# Patient Record
Sex: FEMALE | URBAN - METROPOLITAN AREA
[De-identification: names, ages, dates, MRNs, and addresses within clinical notes are randomized per-mention and may not be internally consistent; named-entity substitution may affect disease eponyms.]

---

## 2023-01-28 ENCOUNTER — HOSPITAL ENCOUNTER (EMERGENCY)
Age: 23
Discharge: LEFT BEFORE DISCHARGE | End: 2023-01-28

## 2023-01-28 ENCOUNTER — APPOINTMENT (OUTPATIENT)
Dept: ULTRASOUND IMAGING | Age: 23
End: 2023-01-28

## 2023-01-28 VITALS
OXYGEN SATURATION: 100 % | DIASTOLIC BLOOD PRESSURE: 86 MMHG | TEMPERATURE: 98.2 F | RESPIRATION RATE: 14 BRPM | HEART RATE: 86 BPM | HEIGHT: 63 IN | SYSTOLIC BLOOD PRESSURE: 134 MMHG | WEIGHT: 148 LBS | BODY MASS INDEX: 26.22 KG/M2

## 2023-01-28 PROCEDURE — 99281 EMR DPT VST MAYX REQ PHY/QHP: CPT

## 2023-01-29 NOTE — ED NOTES
Department of Emergency Medicine  FIRST PROVIDER TRIAGE NOTE             Independent MLP           1/28/23  8:26 PM EST    Date of Encounter: 1/28/23   MRN: [id number]      HPI: Kim Guo is a 25 y.o. female who presents to the ED for Vaginal Bleeding (Pt had positive preg test, states she should be 7 weeks pregnant, started bleeding x 1 hour ago during intercourse, states bright red blood)  And complains of vaginal bleeding which started about an hour ago during intercourse. Patient states that she thinks that she is roughly around 7 weeks pregnant. ROS: Negative for cp or sob. PE: Gen Appearance/Constitutional: alert  CV: regular rate  Pulm: CTA bilat     Initial Plan of Care: All treatment areas with department are currently occupied. Plan to order/Initiate the following while awaiting opening in ED: labs and imaging studies.   Initiate Treatment-Testing, Proceed toTreatment Area When Bed Available for ED Attending/MLP to Continue Care    Electronically signed by TU Golden CNP   DD: 1/28/23       TU Golden CNP  01/28/23 2027

## 2023-01-29 NOTE — ED NOTES
Pt was called back for ultrasound 3 times by tech and 3 more times by this RN bathrooms were checked pt not found      Vin Muhammad RN  01/28/23 7523

## 2023-02-20 LAB
ERYTHROCYTE DISTRIBUTION WIDTH (RATIO) BY AUTOMATED COUNT: 12.8 % (ref 11.5–14.5)
ERYTHROCYTE MEAN CORPUSCULAR HEMOGLOBIN CONCENTRATION (G/DL) BY AUTOMATED: 32.7 G/DL (ref 32–36)
ERYTHROCYTE MEAN CORPUSCULAR VOLUME (FL) BY AUTOMATED COUNT: 92 FL (ref 80–100)
ERYTHROCYTES (10*6/UL) IN BLOOD BY AUTOMATED COUNT: 4.54 X10E12/L (ref 4–5.2)
HEMATOCRIT (%) IN BLOOD BY AUTOMATED COUNT: 41.9 % (ref 36–46)
HEMOGLOBIN (G/DL) IN BLOOD: 13.7 G/DL (ref 12–16)
LEUKOCYTES (10*3/UL) IN BLOOD BY AUTOMATED COUNT: 10.7 X10E9/L (ref 4.4–11.3)
PLATELETS (10*3/UL) IN BLOOD AUTOMATED COUNT: 293 X10E9/L (ref 150–450)
REFLEX ADDED, ANEMIA PANEL: NORMAL

## 2023-02-21 LAB
ABO GROUP (TYPE) IN BLOOD: NORMAL
ANTIBODY SCREEN: NORMAL
ERYTHROCYTE DISTRIBUTION WIDTH (RATIO) BY AUTOMATED COUNT: NORMAL
ERYTHROCYTE MEAN CORPUSCULAR HEMOGLOBIN CONCENTRATION (G/DL) BY AUTOMATED: NORMAL
ERYTHROCYTE MEAN CORPUSCULAR VOLUME (FL) BY AUTOMATED COUNT: NORMAL
ERYTHROCYTES (10*6/UL) IN BLOOD BY AUTOMATED COUNT: NORMAL
HEMATOCRIT (%) IN BLOOD BY AUTOMATED COUNT: NORMAL
HEMOGLOBIN (G/DL) IN BLOOD: NORMAL
HEPATITIS B VIRUS SURFACE AG PRESENCE IN SERUM: NONREACTIVE
HEPATITIS B VIRUS SURFACE AG PRESENCE IN SERUM: NORMAL
HIV 1/ 2 AG/AB SCREEN: NONREACTIVE
HIV 1/ 2 AG/AB SCREEN: NORMAL
LEUKOCYTES (10*3/UL) IN BLOOD BY AUTOMATED COUNT: NORMAL
NRBC (PER 100 WBCS) BY AUTOMATED COUNT: NORMAL
PLATELETS (10*3/UL) IN BLOOD AUTOMATED COUNT: NORMAL
REFLEX ADDED, ANEMIA PANEL: NORMAL
RH FACTOR: NORMAL
RUBELLA VIRUS IGG AB: NORMAL
RUBELLA VIRUS IGG AB: POSITIVE
SYPHILIS TOTAL AB: NONREACTIVE
SYPHILIS TOTAL AB: NORMAL

## 2023-03-29 LAB
CLUE CELLS: NORMAL
NUGENT SCORE: 0
YEAST: NORMAL

## 2023-04-05 LAB — URINE CULTURE: NO GROWTH

## 2023-04-06 LAB — LAB MOLECULAR CA TECHNICAL NOTES: NORMAL

## 2023-06-05 LAB
ANTIBODY SCREEN: NORMAL
ERYTHROCYTE DISTRIBUTION WIDTH (RATIO) BY AUTOMATED COUNT: 13.7 % (ref 11.5–14.5)
ERYTHROCYTE MEAN CORPUSCULAR HEMOGLOBIN CONCENTRATION (G/DL) BY AUTOMATED: 32.5 G/DL (ref 32–36)
ERYTHROCYTE MEAN CORPUSCULAR VOLUME (FL) BY AUTOMATED COUNT: 95 FL (ref 80–100)
ERYTHROCYTES (10*6/UL) IN BLOOD BY AUTOMATED COUNT: 3.89 X10E12/L (ref 4–5.2)
GLUCOSE, 1 HR SCREEN, PREG: 127 MG/DL
HEMATOCRIT (%) IN BLOOD BY AUTOMATED COUNT: 36.9 % (ref 36–46)
HEMOGLOBIN (G/DL) IN BLOOD: 12 G/DL (ref 12–16)
LEUKOCYTES (10*3/UL) IN BLOOD BY AUTOMATED COUNT: 13.1 X10E9/L (ref 4.4–11.3)
PLATELETS (10*3/UL) IN BLOOD AUTOMATED COUNT: 272 X10E9/L (ref 150–450)
REFLEX ADDED, ANEMIA PANEL: ABNORMAL
SYPHILIS TOTAL AB: NONREACTIVE

## 2023-06-10 LAB
CHLAMYDIA TRACH., AMPLIFIED: NEGATIVE
CLUE CELLS: ABNORMAL
N. GONORRHEA, AMPLIFIED: NEGATIVE
NUGENT SCORE: 1
YEAST: PRESENT

## 2023-07-09 ENCOUNTER — HOSPITAL ENCOUNTER (OUTPATIENT)
Dept: DATA CONVERSION | Facility: HOSPITAL | Age: 23
End: 2023-07-09
Attending: OBSTETRICS & GYNECOLOGY
Payer: COMMERCIAL

## 2023-07-09 DIAGNOSIS — Z3A.30 30 WEEKS GESTATION OF PREGNANCY (HHS-HCC): ICD-10-CM

## 2023-07-09 DIAGNOSIS — M54.9 DORSALGIA, UNSPECIFIED: ICD-10-CM

## 2023-07-09 DIAGNOSIS — O99.353 DISEASES OF THE NERVOUS SYSTEM COMPLICATING PREGNANCY, THIRD TRIMESTER (HHS-HCC): ICD-10-CM

## 2023-07-09 DIAGNOSIS — O26.893 OTHER SPECIFIED PREGNANCY RELATED CONDITIONS, THIRD TRIMESTER (HHS-HCC): ICD-10-CM

## 2023-07-09 DIAGNOSIS — Z34.80 ENCOUNTER FOR SUPERVISION OF OTHER NORMAL PREGNANCY, UNSPECIFIED TRIMESTER (HHS-HCC): ICD-10-CM

## 2023-07-09 DIAGNOSIS — R10.2 PELVIC AND PERINEAL PAIN: ICD-10-CM

## 2023-07-09 DIAGNOSIS — O99.891 OTHER SPECIFIED DISEASES AND CONDITIONS COMPLICATING PREGNANCY (HHS-HCC): ICD-10-CM

## 2023-07-09 DIAGNOSIS — G43.909 MIGRAINE, UNSPECIFIED, NOT INTRACTABLE, WITHOUT STATUS MIGRAINOSUS: ICD-10-CM

## 2023-07-11 LAB — URINE CULTURE: NORMAL

## 2023-08-20 ENCOUNTER — HOSPITAL ENCOUNTER (OUTPATIENT)
Dept: DATA CONVERSION | Facility: HOSPITAL | Age: 23
End: 2023-08-20
Attending: OBSTETRICS & GYNECOLOGY
Payer: COMMERCIAL

## 2023-08-20 DIAGNOSIS — O12.03 GESTATIONAL EDEMA, THIRD TRIMESTER (HHS-HCC): ICD-10-CM

## 2023-08-20 DIAGNOSIS — Z3A.36 36 WEEKS GESTATION OF PREGNANCY (HHS-HCC): ICD-10-CM

## 2023-08-20 DIAGNOSIS — O26.813: ICD-10-CM

## 2023-08-20 DIAGNOSIS — R03.0 ELEVATED BLOOD-PRESSURE READING, WITHOUT DIAGNOSIS OF HYPERTENSION: ICD-10-CM

## 2023-08-20 DIAGNOSIS — O98.813 OTHER MATERNAL INFECTIOUS AND PARASITIC DISEASES COMPLICATING PREGNANCY, THIRD TRIMESTER (HHS-HCC): ICD-10-CM

## 2023-08-20 DIAGNOSIS — B37.2 CANDIDIASIS OF SKIN AND NAIL: ICD-10-CM

## 2023-08-20 DIAGNOSIS — O26.893 OTHER SPECIFIED PREGNANCY RELATED CONDITIONS, THIRD TRIMESTER (HHS-HCC): ICD-10-CM

## 2023-08-20 LAB
ALANINE AMINOTRANSFERASE (SGPT) (U/L) IN SER/PLAS: 11 U/L (ref 7–45)
ALBUMIN (G/DL) IN SER/PLAS: 3.4 G/DL (ref 3.4–5)
ALKALINE PHOSPHATASE (U/L) IN SER/PLAS: 147 U/L (ref 33–110)
ANION GAP IN SER/PLAS: 13 MMOL/L (ref 10–20)
ASPARTATE AMINOTRANSFERASE (SGOT) (U/L) IN SER/PLAS: 19 U/L (ref 9–39)
BILIRUBIN TOTAL (MG/DL) IN SER/PLAS: 0.3 MG/DL (ref 0–1.2)
CALCIUM (MG/DL) IN SER/PLAS: 8.5 MG/DL (ref 8.6–10.3)
CARBON DIOXIDE, TOTAL (MMOL/L) IN SER/PLAS: 22 MMOL/L (ref 21–32)
CHLORIDE (MMOL/L) IN SER/PLAS: 103 MMOL/L (ref 98–107)
CREATININE (MG/DL) IN SER/PLAS: 0.63 MG/DL (ref 0.5–1.05)
CREATININE (MG/DL) IN URINE: 48.8 MG/DL (ref 20–320)
ERYTHROCYTE DISTRIBUTION WIDTH (RATIO) BY AUTOMATED COUNT: 13 % (ref 11.5–14.5)
ERYTHROCYTE MEAN CORPUSCULAR HEMOGLOBIN CONCENTRATION (G/DL) BY AUTOMATED: 32.8 G/DL (ref 32–36)
ERYTHROCYTE MEAN CORPUSCULAR VOLUME (FL) BY AUTOMATED COUNT: 91 FL (ref 80–100)
ERYTHROCYTES (10*6/UL) IN BLOOD BY AUTOMATED COUNT: 3.87 X10E12/L (ref 4–5.2)
GFR FEMALE: >90 ML/MIN/1.73M2
GLUCOSE (MG/DL) IN SER/PLAS: 66 MG/DL (ref 74–99)
HEMATOCRIT (%) IN BLOOD BY AUTOMATED COUNT: 35.1 % (ref 36–46)
HEMOGLOBIN (G/DL) IN BLOOD: 11.5 G/DL (ref 12–16)
LACTATE DEHYDROGENASE (U/L) IN SER/PLAS BY LAC->PYR RXN: 177 U/L (ref 84–246)
LEUKOCYTES (10*3/UL) IN BLOOD BY AUTOMATED COUNT: 15.3 X10E9/L (ref 4.4–11.3)
PLATELETS (10*3/UL) IN BLOOD AUTOMATED COUNT: 309 X10E9/L (ref 150–450)
POTASSIUM (MMOL/L) IN SER/PLAS: 4.1 MMOL/L (ref 3.5–5.3)
PROTEIN (MG/DL) IN URINE: 11 MG/DL (ref 5–24)
PROTEIN TOTAL: 6.4 G/DL (ref 6.4–8.2)
PROTEIN/CREATININE (MG/MG) IN URINE: 0.23 MG/MG CREAT (ref 0–0.17)
SODIUM (MMOL/L) IN SER/PLAS: 134 MMOL/L (ref 136–145)
URATE (MG/DL) IN SER/PLAS: 3.8 MG/DL (ref 2.3–6.7)
UREA NITROGEN (MG/DL) IN SER/PLAS: 6 MG/DL (ref 6–23)

## 2023-08-22 LAB — GROUP B STREP SCREEN: NORMAL

## 2023-08-25 LAB
MISCELLANEUOUS TEST RESULT: NORMAL
NAME OF SENDOUT TEST: NORMAL

## 2023-09-29 VITALS — HEIGHT: 63 IN | BODY MASS INDEX: 32.81 KG/M2 | WEIGHT: 185.19 LBS

## 2023-09-30 NOTE — PROGRESS NOTES
Current Stage:   Stage: Triage     OB Dating:   EDC/EGA:  ·  Final CLAUDIA 17-Sep-2023   ·  EGA 30     Subjective Data:   Antepartum:  Vaginal Bleeding: No   Contractions/Abdominal Pain: Yes   Discharge/Loss of Fluid: No   Fetal Movement: Good   Fevers/Chills: No   Preeclampsia Symptoms: No   Antepartum:    24 yo.  at 30 weeks, presenting with pelvic pain and back pain for 3 days. She reports normal fetal movement, denies any contractions, vaginal bleeding or recent  intercourse. She notes that her pain is aggravated by physical activity and relieved by rest. She denies any pain or burning  with urination or fever/chills. She states that she has been hydrating well. Current pregnancy has been complicated with migraines  and back pain         Physical Exam:   Constitutional: alert, oriented   Obstetric: [place]: no contractions noted  FHT: 150s, moderate variability, appropriate for gestational age  SVE: closed/posterior   Gastrointestinal: Uterus is gravid and nontender   Genitourinary: No abnormal discharge noted   Musculoskeletal: Normal gait and negative muscle  weakness   Extremities: No edema or calf tenderness noted   Breast: no masses   Psychological: appropriate affect   Skin: no rashes or lesions     Assessment and Plan:        Additional Dx:   Back pain during pregnancy in third trimester: Entered Date:  2023 15:06   Pain of round ligament affecting pregnancy, antepartum: Entered  Date: 2023 15:06   30 weeks gestation of pregnancy: Entered Date: 2023  15:06    Assessment:    24 yo.  at 30 weeks, here with round ligament and back pain  No contractions noted , and cervix is close  She denies any dysuria  Urine dip was positive for trace blood, will order CC urine cultures  Discussed increased hydration with electrolytes and ES tylenol   Also discussed pregnancy support belly band, especially with increased activity  Patient reassured and discharged in stable condition.        Electronic Signatures:  Clara Mayo)  (Signed 09-Jul-2023 15:12)   Authored: Current Stage, OB Dating, Subjective Data,  Objective Data, Assessment and Plan, Note Completion      Last Updated: 09-Jul-2023 15:12 by Clara Mayo)

## 2023-09-30 NOTE — PROGRESS NOTES
"    Current Stage:   Stage: Triage     OB Dating:   EDC/EGA:  ·  Final CLAUDIA 16-Sep-2023   ·  EGA 36.1     Subjective Data:   Antepartum:  Vaginal Bleeding: No   Contractions/Abdominal Pain: No   Discharge/Loss of Fluid: No   Fetal Movement: Good   Fevers/Chills: No   Preeclampsia Symptoms: Fatigue, rib tightness   Antepartum:    24 yo G1 at 36/1 wks, CLAUDIA 9/16/23 c/w US, presenting to triage for elevated home BP. She was found to have a BP of 130/95 at home. She has felt fatigued and \"off\"  over the past few days. She did have chest tightness evaluated in the ED, ECG normal. It is worse with inspiration. She has mild SOB when exerting herself.  No HA. No RUQ pain, n/v, or calf pain.    Pt additionally has some irritation and pain in the inguinal region over the past few days. No changes in detergents. No new clothing. She states that she has been itching it. No drainage and the area has not been expanding.      Objective Information:    Objective Information:      T   P  R  BP   MAP  SpO2   Value  36.1  109  16  111/75   88  98%  Date/Time 8/20 12:21 8/20 13:05 8/20 12:55 8/20 12:55  8/20 12:55 8/20 13:05  Range  (36.1C - 36.1C )  (80 - 109 )  (16 - 17 )  (111 - 129 )/ (72 - 82 )  (88 - 88 )  (97% - 99% )      Pain reported at 8/20 12:21: 0 = None      Physical Exam:   Constitutional: alert, oriented   Obstetric: Minimal blood on pad.   Head/Neck: neck supple, no thyromegaly   Respiratory/Thorax: normal respiratory effort, lungs  clear, no wheezes or rhonchi   Gastrointestinal: Soft, nontender. Uterus 2 below  umbilicus.   Genitourinary: Small, erythematous rash of the inguinal  region. Greater on the left than right. No ulcerations. nontender to palpation.   Extremities: +1 edema bilateral LE     Assessment and Plan:   Assessment:    24 yo G1 at 36/1 wks, CLAUDIA 9/16/23 c/w US, presenting to triage for r/o Pre-E.    1. R/o Pre-E  -VSS, BP normotensive in triage.  -NST reactive: 135/mod samira/pos accel/neg decel  TOCO: " quiet  -Vague symptoms, no HA.  -CBC, CMP, LDH, uric acid, and protein/creatinine ratio wnl. Pt instructed to bring her BP cuff in tomorrow.    2. Subcutaneous yeast infection.  -Discussed options with patient. She would prefer to try Topical Hydrocortisone with Nystatin powder over the counter. Pt has appt in AM tomorrow.    F/u as scheduled tomorrow.     Plan of Care Reviewed With:  Plan of Care Reviewed With: patient       Electronic Signatures:  Amarjit Short)  (Signed 20-Aug-2023 14:46)   Authored: Current Stage, OB Dating, Subjective Data,  Objective Data, Assessment and Plan, Note Completion      Last Updated: 20-Aug-2023 14:46 by Amarjit Short)

## 2023-10-13 PROBLEM — R31.9 HEMATURIA: Status: ACTIVE | Noted: 2023-10-13

## 2023-10-13 PROBLEM — Z86.19 HISTORY OF HERPES GENITALIS: Status: ACTIVE | Noted: 2023-10-13

## 2023-10-13 PROBLEM — O13.9 GESTATIONAL HYPERTENSION (HHS-HCC): Status: ACTIVE | Noted: 2023-10-13

## 2023-10-13 PROBLEM — H66.90 OTITIS MEDIA: Status: ACTIVE | Noted: 2023-10-13

## 2023-10-13 PROBLEM — F32.A DEPRESSION: Status: ACTIVE | Noted: 2023-10-13

## 2023-10-13 PROBLEM — N76.0 VULVOVAGINITIS: Status: ACTIVE | Noted: 2023-10-13

## 2023-10-13 PROBLEM — K21.9 CHRONIC GERD: Status: ACTIVE | Noted: 2023-10-13

## 2023-10-13 PROBLEM — R21 BUTTERFLY RASH: Status: ACTIVE | Noted: 2023-10-13

## 2023-10-13 PROBLEM — H69.90 EUSTACHIAN TUBE DYSFUNCTION: Status: ACTIVE | Noted: 2023-10-13

## 2023-10-13 PROBLEM — F43.10 PTSD (POST-TRAUMATIC STRESS DISORDER): Status: ACTIVE | Noted: 2023-10-13

## 2023-10-13 PROBLEM — R35.0 URINARY FREQUENCY: Status: ACTIVE | Noted: 2023-10-13

## 2023-10-13 RX ORDER — ASCORBIC ACID, CHOLECALCIFEROL, .ALPHA.-TOCOPHEROL, DL-, FOLIC ACID, PYRIDOXINE HYDROCHLORIDE, CYANOCOBALAMIN, CALCIUM FORMATE, FERROUS ASPARTO GLYCINATE, MAGNESIUM OXIDE AND DOCONEXENT 90; 400; 40; 1; 26; 25; 155; 18; 50; 300 MG/1; [IU]/1; [IU]/1; MG/1; MG/1; UG/1; MG/1; MG/1; MG/1; MG/1
1 CAPSULE, GELATIN COATED ORAL DAILY
COMMUNITY
Start: 2023-04-15 | End: 2023-10-16 | Stop reason: ALTCHOICE

## 2023-10-13 RX ORDER — MOMETASONE FUROATE 1 MG/ML
SOLUTION TOPICAL 2 TIMES DAILY
COMMUNITY
Start: 2015-01-07 | End: 2023-11-24 | Stop reason: WASHOUT

## 2023-10-13 RX ORDER — OXYBUTYNIN CHLORIDE 5 MG/1
5 TABLET, EXTENDED RELEASE ORAL DAILY
COMMUNITY
Start: 2022-11-28 | End: 2023-10-16 | Stop reason: ALTCHOICE

## 2023-10-13 RX ORDER — ASPIRIN 325 MG
50000 TABLET, DELAYED RELEASE (ENTERIC COATED) ORAL
COMMUNITY
Start: 2022-10-21 | End: 2023-11-30 | Stop reason: WASHOUT

## 2023-10-13 RX ORDER — BUTALBITAL, ACETAMINOPHEN AND CAFFEINE 50; 325; 40 MG/1; MG/1; MG/1
1-2 TABLET ORAL EVERY 4 HOURS PRN
COMMUNITY
Start: 2023-05-18 | End: 2023-10-16 | Stop reason: ALTCHOICE

## 2023-10-13 RX ORDER — BUPROPION HYDROCHLORIDE 300 MG/1
300 TABLET ORAL DAILY
COMMUNITY
Start: 2023-07-06

## 2023-10-13 RX ORDER — CYCLOBENZAPRINE HCL 10 MG
10 TABLET ORAL 3 TIMES DAILY PRN
COMMUNITY
Start: 2022-10-19 | End: 2023-10-16 | Stop reason: ALTCHOICE

## 2023-10-13 RX ORDER — VALACYCLOVIR HYDROCHLORIDE 500 MG/1
500 TABLET, FILM COATED ORAL DAILY
COMMUNITY
Start: 2023-08-07 | End: 2023-10-16 | Stop reason: ALTCHOICE

## 2023-10-13 RX ORDER — ONDANSETRON 4 MG/1
4 TABLET, ORALLY DISINTEGRATING ORAL EVERY 4 HOURS PRN
COMMUNITY
Start: 2023-01-25 | End: 2023-10-16 | Stop reason: ALTCHOICE

## 2023-10-13 RX ORDER — FLUTICASONE PROPIONATE 50 MCG
2 SPRAY, SUSPENSION (ML) NASAL DAILY
COMMUNITY
Start: 2015-01-07 | End: 2023-11-24 | Stop reason: WASHOUT

## 2023-10-16 ENCOUNTER — OFFICE VISIT (OUTPATIENT)
Dept: OBSTETRICS AND GYNECOLOGY | Facility: CLINIC | Age: 23
End: 2023-10-16
Payer: COMMERCIAL

## 2023-10-16 VITALS — SYSTOLIC BLOOD PRESSURE: 128 MMHG | WEIGHT: 167 LBS | DIASTOLIC BLOOD PRESSURE: 84 MMHG | BODY MASS INDEX: 29.66 KG/M2

## 2023-10-16 PROCEDURE — 3074F SYST BP LT 130 MM HG: CPT | Performed by: OBSTETRICS & GYNECOLOGY

## 2023-10-16 PROCEDURE — 3079F DIAST BP 80-89 MM HG: CPT | Performed by: OBSTETRICS & GYNECOLOGY

## 2023-10-16 PROCEDURE — 0503F POSTPARTUM CARE VISIT: CPT | Performed by: OBSTETRICS & GYNECOLOGY

## 2023-10-16 NOTE — PROGRESS NOTES
23 y.o.  presenting for postpartum follow-up   H/o  at [facility]  H/o GHTN, stable BP at home and in office, without meds  Denies any acute symptoms   Bottle feeding     Concerns: none    Pain: controlled  Lochia: none  Menses: Yes, describe: LMP: 10/16/23  Sexual Intimacy: No  Contraceptive Method: None  Feeding Method:   Lactation Consult Needed?: No      IMPRESSIONS:  Thank you for coming to your postpartum visit. Everything seems to be recovering appropriately at this time. You are free to resume normal activity. Please don't hesitate to call us for breast complaints, abnormal bleeding, mood changes, or other concerning symptoms - we have many good treatment options for these issues.     Problem List Items Addressed This Visit       Encounter for postpartum visit - Primary   6-week postpartum visit  Declines contraception  Pap could not be performed due to patient being on her menstrual cycle  We will have her follow-up for annual exam in 2024  No orders of the defined types were placed in this encounter.  We look forward to seeing you again at your next scheduled visit in 3 months.    Clara Mayo MD

## 2023-11-01 ENCOUNTER — TELEPHONE (OUTPATIENT)
Dept: OBSTETRICS AND GYNECOLOGY | Facility: CLINIC | Age: 23
End: 2023-11-01
Payer: COMMERCIAL

## 2023-11-01 NOTE — TELEPHONE ENCOUNTER
Patient is having itchiness and irration for the past 2-3 days, she is asking if Diflucan can be filled at [facility] in [facility]?

## 2023-11-06 ENCOUNTER — LAB REQUISITION (OUTPATIENT)
Dept: LAB | Facility: HOSPITAL | Age: 23
End: 2023-11-06
Payer: COMMERCIAL

## 2023-11-06 DIAGNOSIS — A60.00 HERPESVIRAL INFECTION OF UROGENITAL SYSTEM, UNSPECIFIED: ICD-10-CM

## 2023-11-06 DIAGNOSIS — N76.0 ACUTE VAGINITIS: ICD-10-CM

## 2023-11-06 PROCEDURE — 87102 FUNGUS ISOLATION CULTURE: CPT

## 2023-11-06 PROCEDURE — 87529 HSV DNA AMP PROBE: CPT

## 2023-11-08 ENCOUNTER — HOSPITAL ENCOUNTER (EMERGENCY)
Facility: HOSPITAL | Age: 23
Discharge: HOME | End: 2023-11-08
Attending: EMERGENCY MEDICINE
Payer: COMMERCIAL

## 2023-11-08 ENCOUNTER — APPOINTMENT (OUTPATIENT)
Dept: RADIOLOGY | Facility: HOSPITAL | Age: 23
End: 2023-11-08
Payer: COMMERCIAL

## 2023-11-08 VITALS
SYSTOLIC BLOOD PRESSURE: 137 MMHG | WEIGHT: 165 LBS | OXYGEN SATURATION: 98 % | TEMPERATURE: 98.1 F | DIASTOLIC BLOOD PRESSURE: 81 MMHG | HEART RATE: 95 BPM | HEIGHT: 63 IN | RESPIRATION RATE: 16 BRPM | BODY MASS INDEX: 29.23 KG/M2

## 2023-11-08 DIAGNOSIS — R10.9 ABDOMINAL PAIN, UNSPECIFIED ABDOMINAL LOCATION: Primary | ICD-10-CM

## 2023-11-08 LAB
APPEARANCE UR: CLEAR
BILIRUB UR STRIP.AUTO-MCNC: NEGATIVE MG/DL
CLUE CELLS SPEC QL WET PREP: NORMAL
COLOR UR: COLORLESS
ERYTHROCYTE [DISTWIDTH] IN BLOOD BY AUTOMATED COUNT: 13.7 % (ref 11.5–14.5)
GLUCOSE UR STRIP.AUTO-MCNC: NEGATIVE MG/DL
HCG UR QL IA.RAPID: NEGATIVE
HCT VFR BLD AUTO: 40.6 % (ref 36–46)
HGB BLD-MCNC: 13.5 G/DL (ref 12–16)
HOLD SPECIMEN: 293
HSV1 DNA SKIN QL NAA+PROBE: NOT DETECTED
HSV2 DNA SKIN QL NAA+PROBE: NOT DETECTED
KETONES UR STRIP.AUTO-MCNC: NEGATIVE MG/DL
LEUKOCYTE ESTERASE UR QL STRIP.AUTO: NEGATIVE
MCH RBC QN AUTO: 28.7 PG (ref 26–34)
MCHC RBC AUTO-ENTMCNC: 33.3 G/DL (ref 32–36)
MCV RBC AUTO: 86 FL (ref 80–100)
NITRITE UR QL STRIP.AUTO: NEGATIVE
NRBC BLD-RTO: 0 /100 WBCS (ref 0–0)
PH UR STRIP.AUTO: 6 [PH]
PLATELET # BLD AUTO: 374 X10*3/UL (ref 150–450)
PROT UR STRIP.AUTO-MCNC: NEGATIVE MG/DL
RBC # BLD AUTO: 4.71 X10*6/UL (ref 4–5.2)
RBC # UR STRIP.AUTO: NEGATIVE /UL
SP GR UR STRIP.AUTO: 1
T VAGINALIS SPEC QL WET PREP: NORMAL
UROBILINOGEN UR STRIP.AUTO-MCNC: <2 MG/DL
WBC # BLD AUTO: 11.3 X10*3/UL (ref 4.4–11.3)
WBC VAG QL WET PREP: NORMAL
YEAST VAG QL WET PREP: NORMAL

## 2023-11-08 PROCEDURE — 81003 URINALYSIS AUTO W/O SCOPE: CPT | Performed by: NURSE PRACTITIONER

## 2023-11-08 PROCEDURE — 36415 COLL VENOUS BLD VENIPUNCTURE: CPT | Performed by: NURSE PRACTITIONER

## 2023-11-08 PROCEDURE — 76856 US EXAM PELVIC COMPLETE: CPT | Performed by: RADIOLOGY

## 2023-11-08 PROCEDURE — 76830 TRANSVAGINAL US NON-OB: CPT | Performed by: RADIOLOGY

## 2023-11-08 PROCEDURE — 87529 HSV DNA AMP PROBE: CPT | Mod: PORLAB | Performed by: NURSE PRACTITIONER

## 2023-11-08 PROCEDURE — 87800 DETECT AGNT MULT DNA DIREC: CPT | Mod: PORLAB | Performed by: NURSE PRACTITIONER

## 2023-11-08 PROCEDURE — 81025 URINE PREGNANCY TEST: CPT | Performed by: NURSE PRACTITIONER

## 2023-11-08 PROCEDURE — 76856 US EXAM PELVIC COMPLETE: CPT

## 2023-11-08 PROCEDURE — 99284 EMERGENCY DEPT VISIT MOD MDM: CPT | Mod: 25

## 2023-11-08 PROCEDURE — 85027 COMPLETE CBC AUTOMATED: CPT | Performed by: NURSE PRACTITIONER

## 2023-11-08 PROCEDURE — 99285 EMERGENCY DEPT VISIT HI MDM: CPT | Mod: 25 | Performed by: EMERGENCY MEDICINE

## 2023-11-08 PROCEDURE — 87210 SMEAR WET MOUNT SALINE/INK: CPT | Performed by: NURSE PRACTITIONER

## 2023-11-08 ASSESSMENT — LIFESTYLE VARIABLES
HAVE PEOPLE ANNOYED YOU BY CRITICIZING YOUR DRINKING: NO
REASON UNABLE TO ASSESS: NO
HAVE YOU EVER FELT YOU SHOULD CUT DOWN ON YOUR DRINKING: NO
EVER FELT BAD OR GUILTY ABOUT YOUR DRINKING: NO
EVER HAD A DRINK FIRST THING IN THE MORNING TO STEADY YOUR NERVES TO GET RID OF A HANGOVER: NO

## 2023-11-08 ASSESSMENT — COLUMBIA-SUICIDE SEVERITY RATING SCALE - C-SSRS
2. HAVE YOU ACTUALLY HAD ANY THOUGHTS OF KILLING YOURSELF?: NO
1. IN THE PAST MONTH, HAVE YOU WISHED YOU WERE DEAD OR WISHED YOU COULD GO TO SLEEP AND NOT WAKE UP?: NO
6. HAVE YOU EVER DONE ANYTHING, STARTED TO DO ANYTHING, OR PREPARED TO DO ANYTHING TO END YOUR LIFE?: NO

## 2023-11-08 ASSESSMENT — PAIN SCALES - GENERAL: PAINLEVEL_OUTOF10: 6

## 2023-11-08 ASSESSMENT — PAIN - FUNCTIONAL ASSESSMENT: PAIN_FUNCTIONAL_ASSESSMENT: 0-10

## 2023-11-09 ENCOUNTER — TELEPHONE (OUTPATIENT)
Dept: OBSTETRICS AND GYNECOLOGY | Facility: CLINIC | Age: 23
End: 2023-11-09
Payer: COMMERCIAL

## 2023-11-09 LAB
C TRACH RRNA SPEC QL NAA+PROBE: NEGATIVE
N GONORRHOEA DNA SPEC QL PROBE+SIG AMP: NEGATIVE

## 2023-11-09 RX ORDER — NORETHINDRONE 5 MG/1
5 TABLET ORAL DAILY
COMMUNITY
Start: 2023-11-09 | End: 2023-11-30 | Stop reason: WASHOUT

## 2023-11-09 NOTE — TELEPHONE ENCOUNTER
Dr. Mayo reviewed ultrasound and wants patient to start Aygestin 5mg daily. Left detailed voice message for patient.

## 2023-11-09 NOTE — ED PROVIDER NOTES
HPI   Chief Complaint   Patient presents with    Vaginal Bleeding     2 months post partum.   Recently treated for herpes infection       Pt is a 23 year female  who presents to the ER with vaginal  bleeding that began 2023. Pt reports that she has abdominal pain, headache and is using a tampon every 2 to 3 hours which is abnormal for her. Pt reports feeling feverish. Pt is 2 months postpartum from vaginal birth, 2 week early  induction due to gestational hypertension. Pt states that she also suffered from a uterine infection from having her water broke with induction (resolved). Pt reports that she was also treated for a yeast infection/herpes a week ago with fluconazole and valacyclovir. Pt reports her labs were negative for herpes, but no results regarding the yeast infection. Pt reports that she had vaginal discharge 2 days ago but cannot tell if there is still discharge due to heavy bleed. She does not feel that the medication treated her yeast infection.  Pt reported being light head this morning, but nothing since this morning. He stated that she has been able to drink liquids, but nausea has caused her to not eat her normal caloric intake. She attempted intercourse with her significant other 6 weeks after delivery and 2 weeks ago, both with pain and bleeding with the second attempt being more painful. Pt reports having the same sexual partner.  Pt is not using any contraceptive or any form of birth control and not currently breast feeding. Pt was treated with doxycycline for URI 2 weeks ago, before being treated for a yeast infection. Pt denies any numbness or pain in extremities.                           No data recorded                Patient History   Past Medical History:   Diagnosis Date    Personal history of other diseases of the respiratory system 2015    History of acute bacterial sinusitis     Past Surgical History:   Procedure Laterality Date    EYE SURGERY      TYMPANOSTOMY TUBE  PLACEMENT Bilateral     UPPER GASTROINTESTINAL ENDOSCOPY       Family History   Problem Relation Name Age of Onset    Testicular cancer Father      Other (mental disorder) Father      Hypertension Father      Other (small cell lung cancer) Maternal Grandmother      Breast cancer Maternal Grandmother      Other (Cardiac disorder) Maternal Grandfather      Other (Cardiac disorder) Paternal Grandmother      Other (Cardiac disorder) Paternal Grandfather      Diabetes Paternal Grandfather       Social History     Tobacco Use    Smoking status: Never    Smokeless tobacco: Never   Vaping Use    Vaping Use: Every day   Substance Use Topics    Alcohol use: Yes     Comment: occasional    Drug use: Never       Physical Exam   ED Triage Vitals [11/08/23 1752]   Temp Heart Rate Resp BP   36.7 °C (98.1 °F) 95 16 137/81      SpO2 Temp Source Heart Rate Source Patient Position   98 % Tympanic Monitor Sitting      BP Location FiO2 (%)     Left arm --       Physical Exam  Vitals and nursing note reviewed. Exam conducted with a chaperone present.   HENT:      Right Ear: External ear normal.      Left Ear: External ear normal.      Nose: Nose normal.      Mouth/Throat:      Mouth: Mucous membranes are moist.      Pharynx: Oropharynx is clear.   Eyes:      Conjunctiva/sclera: Conjunctivae normal.      Pupils: Pupils are equal, round, and reactive to light.   Cardiovascular:      Rate and Rhythm: Normal rate and regular rhythm.      Pulses: Normal pulses.      Heart sounds: Normal heart sounds.   Pulmonary:      Effort: Pulmonary effort is normal.      Breath sounds: Normal breath sounds.   Abdominal:      General: Bowel sounds are normal.      Palpations: Abdomen is soft.      Tenderness: There is generalized abdominal tenderness and tenderness in the right lower quadrant and suprapubic area. Negative signs include McBurney's sign, psoas sign and obturator sign.   Genitourinary:     Exam position: Supine.      Labia:         Left:  Tenderness and lesion present.       Vagina: Bleeding present.      Cervix: Cervical bleeding present. No friability, lesion, erythema or eversion.      Uterus: Normal.       Adnexa: Right adnexa normal.        Left: Tenderness present. No mass or fullness.     Musculoskeletal:         General: Normal range of motion.   Skin:     General: Skin is warm.   Neurological:      General: No focal deficit present.      Mental Status: She is alert.   Psychiatric:         Mood and Affect: Mood normal.         ED Course & MDM   Diagnoses as of 11/14/23 0655   Abdominal pain, unspecified abdominal location       Medical Decision Making  Patient was seen and evaluated with the attending physician, Dr. Fagan.  The patient is presenting to the emergency room with complaints of vaginal bleeding and pelvic pain.  Differential diagnosis includes resume menstrual cycle, STD, miscarriage, or other acute process.  A saline lock was established and laboratory studies were drawn with results as noted.  The patient's H&H is stable.  She does not have an elevated white count to be indicative of infection.  Her routine urinalysis was obtained and was negative for infection.  Pelvic exam was performed and it was noted that the patient had blood in the vaginal vault.  She had a small lesion noted to the left labia majora.  It was not exactly consistent with herpetic lesion.  Swab was obtained but we were notified that it was the incorrect swab.  A proper swab will be obtained.  The patient's wet prep was unremarkable.  Gonorrhea chlamydia swabs were obtained and results are pending at the time of dictation.  The patient had left adnexal tenderness with the bimanual exam.  An ultrasound is ordered and results are pending.    Patient signed over to me pending ultrasound pelvic ultrasound was unremarkable for any acute process.  Patient was counseled regarding her ultrasound and advised to follow-up with her OB/GYN physician she was discharged  in the ED in stable condition    Procedure  Procedures     Kathy Fagan MD  11/14/23 0682

## 2023-11-09 NOTE — PROGRESS NOTES
I saw the patient along with the PAPITO who signed out the patient to me at change of shift.  Patient was pending ultrasound of the pelvis which showed likely endometrial blood consistent with her having vaginal bleeding which is likely the onset of her menstrual cycle lower concern for any kind of retained products.  Her pregnancy test was in fact negative here she was counseled regarding her ED work-up and advised to follow-up with her OB/GYN physician.  She was discharged in the ED in stable condition

## 2023-11-09 NOTE — TELEPHONE ENCOUNTER
Patient was in the ED last night for pain and heavy bleeding. I already had her scheduled with Dr. Morrell for Monday, but while in the ED she talked with Dr. Morrell and he wants Dr. Mayo to also look over the results to see if she needs to come in today with her, or if she would be ok to wait until Monday. Please advise.

## 2023-11-10 LAB
HSV1 DNA SKIN QL NAA+PROBE: NOT DETECTED
HSV2 DNA SKIN QL NAA+PROBE: NOT DETECTED

## 2023-11-12 LAB — YEAST SPEC QL CULT: NORMAL

## 2023-11-13 ENCOUNTER — APPOINTMENT (OUTPATIENT)
Dept: OBSTETRICS AND GYNECOLOGY | Facility: CLINIC | Age: 23
End: 2023-11-13
Payer: COMMERCIAL

## 2023-11-24 ENCOUNTER — OFFICE VISIT (OUTPATIENT)
Dept: GASTROENTEROLOGY | Facility: CLINIC | Age: 23
End: 2023-11-24
Payer: COMMERCIAL

## 2023-11-24 VITALS
BODY MASS INDEX: 30 KG/M2 | WEIGHT: 163 LBS | HEART RATE: 88 BPM | SYSTOLIC BLOOD PRESSURE: 124 MMHG | DIASTOLIC BLOOD PRESSURE: 82 MMHG | HEIGHT: 62 IN

## 2023-11-24 DIAGNOSIS — R19.4 CHANGE IN BOWEL HABITS: ICD-10-CM

## 2023-11-24 DIAGNOSIS — R10.13 EPIGASTRIC PAIN: ICD-10-CM

## 2023-11-24 DIAGNOSIS — R10.84 GENERALIZED ABDOMINAL PAIN: ICD-10-CM

## 2023-11-24 DIAGNOSIS — R19.7 DIARRHEA, UNSPECIFIED TYPE: Primary | ICD-10-CM

## 2023-11-24 DIAGNOSIS — R11.0 NAUSEA: ICD-10-CM

## 2023-11-24 DIAGNOSIS — K62.89 RECTAL PAIN: ICD-10-CM

## 2023-11-24 PROCEDURE — 1036F TOBACCO NON-USER: CPT | Performed by: INTERNAL MEDICINE

## 2023-11-24 PROCEDURE — 99204 OFFICE O/P NEW MOD 45 MIN: CPT | Performed by: INTERNAL MEDICINE

## 2023-11-24 PROCEDURE — 3074F SYST BP LT 130 MM HG: CPT | Performed by: INTERNAL MEDICINE

## 2023-11-24 PROCEDURE — 3079F DIAST BP 80-89 MM HG: CPT | Performed by: INTERNAL MEDICINE

## 2023-11-24 RX ORDER — POLYETHYLENE GLYCOL 3350, SODIUM SULFATE ANHYDROUS, SODIUM BICARBONATE, SODIUM CHLORIDE, POTASSIUM CHLORIDE 236; 22.74; 6.74; 5.86; 2.97 G/4L; G/4L; G/4L; G/4L; G/4L
4000 POWDER, FOR SOLUTION ORAL ONCE
Qty: 4000 ML | Refills: 0 | Status: SHIPPED | OUTPATIENT
Start: 2023-11-24 | End: 2023-11-30 | Stop reason: WASHOUT

## 2023-11-24 RX ORDER — DICYCLOMINE HYDROCHLORIDE 10 MG/1
10 CAPSULE ORAL 4 TIMES DAILY PRN
Qty: 60 CAPSULE | Refills: 11 | Status: SHIPPED | OUTPATIENT
Start: 2023-11-24 | End: 2024-02-29

## 2023-11-24 NOTE — PATIENT INSTRUCTIONS
Trial of bentyl     High fiber diet 30g of fiber a day     Obtain stool studies     You have been scheduled for an upper endoscopy (EGD)/colonoscopy .  You were given instructions for preparing for this test in the office today.  If you have questions about these instructions, please call my office at [phone].    After your procedure, you can expect me to talk to you to go over the results of the procedure.    You were also given information regarding the schedule for your procedure including the time that you need to arrive to the endoscopy unit.  You will also be contacted 2-3 day prior to your procedure to confirm the final arrival time.  If you have questions about this or if you need to cancel or change this appointment please call my office at [phone].

## 2023-11-24 NOTE — PROGRESS NOTES
[facility] Gastroenterology    ASSESSMENT and PLAN:       Melanie Lance is a 23 y.o. female with a significant past medical history of anxiety depression who presents for consultation requested by her primary care provider (Turner Ruff MD) for the evaluation of blood in stool, epigastric pain, abdominal pain and change in bowel habits with increasing diarrhea.       Abdominal pain/increase in urgency/blood in the stool/change in bowel habits with increasing diarrhea  -Alarm symptoms on exam with increased urgency and of rectal pain and blood in the stool  -Will obtain stool studies rule out infectious etiology  -Plan on diagnostic colonoscopy for evaluation    2.  Intractable nausea vomiting/epigastric pain  -Peptic ulcer disease versus functional dyspepsia versus biliary versus gastroparesis  -We will move forward with EGD for evaluation  -If EGD is negative we will move forward with gastric emptying study to evaluate for gastroparesis    Risk benefits EGD and colonoscopy to the patient detail she voiced understanding        Raymond Eason DO         Gastroenterology    [facility]            Subjective   HISTORY OF PRESENT ILLNESS:     Chief Complaint  Abdominal Pain    History Of Present Illness:    Melanie Lance is a 23 y.o. female with a significant past medical history of anxiety depression who presents for consultation requested by her primary care provider (Turner Ruff MD) for the evaluation of blood in stool, epigastric pain, abdominal pain and change in bowel habits with increasing diarrhea.    Patient is for evaluation of change in bowel habits and increasing diarrhea along with nausea and vomiting.  She also notes some blood present in the toilet bowl.  She also admits to rectal pain.  She states she is 3 months postpartum and has had ongoing abdominal pain along with some blood in her stool and nausea vomiting since then.  She denies  any recent travel time spent in the woods recently and recent antibiotics.  She denies any use of ibuprofen or Aleve in the outpatient setting.    Patient is currently not breast-feeding.              Patient denies any heartburn/GERD, N/V, dysphagia, odynophagia,  hematemesis,  melena, or weight loss.      Endoscopy History:  Colonoscopy in 2021 at [facility] that was normal biopsies were taken for diarrhea at that time patient internal hemorrhoids      Review of systems:   Review of Systems      I performed a complete 10 point review of systems and it is negative except as noted in HPI or above.        PAST HISTORIES:       Past Medical History:  She has a past medical history of Personal history of other diseases of the respiratory system (01/02/2015).    Past Surgical History:  She has a past surgical history that includes Tympanostomy tube placement (Bilateral); Eye surgery; and Upper gastrointestinal endoscopy.      Social History:  She reports that she has never smoked. She has never used smokeless tobacco. She reports current alcohol use. She reports that she does not use drugs.    Family History:  No known GI disease, specifically denies pancreatitis, Crohn's, colon cancer, gastroesophageal cancer, or ulcerative colitis.    Family History   Problem Relation Name Age of Onset    Testicular cancer Father      Other (mental disorder) Father      Hypertension Father      Other (small cell lung cancer) Maternal Grandmother      Breast cancer Maternal Grandmother      Other (Cardiac disorder) Maternal Grandfather      Other (Cardiac disorder) Paternal Grandmother      Other (Cardiac disorder) Paternal Grandfather      Diabetes Paternal Grandfather          Allergies:  Amoxicillin, Azithromycin, Cefdinir, and Miconazole        Objective   OBJECTIVE:       Last Recorded Vitals:  Vitals:    11/24/23 0824   BP: 124/82   BP Location: Left arm   Patient Position: Sitting   Pulse: 88   Weight: 73.9 kg (163 lb)   Height: 1.575  "m (5' 2\")     /82 (BP Location: Left arm, Patient Position: Sitting)   Pulse 88   Ht 1.575 m (5' 2\")   Wt 73.9 kg (163 lb)   BMI 29.81 kg/m²      Physical Exam:    Physical Exam  Vitals reviewed.   Constitutional:       General: She is awake.      Appearance: Normal appearance.   HENT:      Head: Normocephalic.      Mouth/Throat:      Mouth: Mucous membranes are moist.   Eyes:      Extraocular Movements: Extraocular movements intact.   Cardiovascular:      Rate and Rhythm: Normal rate.      Heart sounds: Normal heart sounds.   Pulmonary:      Effort: Pulmonary effort is normal.      Breath sounds: Normal breath sounds.   Abdominal:      General: Bowel sounds are normal.      Palpations: Abdomen is soft.      Tenderness: There is no abdominal tenderness. There is no guarding or rebound.      Hernia: No hernia is present.   Musculoskeletal:         General: Normal range of motion.      Cervical back: Neck supple.   Skin:     General: Skin is warm and dry.   Neurological:      General: No focal deficit present.      Mental Status: She is alert.   Psychiatric:         Attention and Perception: Attention and perception normal.         Mood and Affect: Mood normal.         Behavior: Behavior normal.           Home Medications:  Prior to Admission medications    Medication Sig Start Date End Date Taking? Authorizing Provider   buPROPion XL (Wellbutrin XL) 300 mg 24 hr tablet Take 1 tablet (300 mg) by mouth once daily. 7/6/23  Yes Historical Provider, MD   cholecalciferol (Vitamin D-3) 1,250 mcg (50,000 unit) capsule Take 1 capsule (50,000 Units) by mouth 1 (one) time per week. 10/21/22  Yes Historical Provider, MD   norethindrone (Aygestin) 5 mg tablet Take 1 tablet (5 mg) by mouth once daily. 11/9/23 11/19/23  Historical Provider, MD   fluticasone (Flonase) 50 mcg/actuation nasal spray Administer 2 sprays into affected nostril(s) once daily. 1/7/15 11/24/23  Historical Provider, MD   ibuprofen 600 mg tablet TAKE " "1 TABLET BY MOUTH EVERY 6 HOURS  Patient not taking: Reported on 11/24/2023 9/3/23 11/24/23  Greg Pérez MD   Lactobacillus acidophilus (PROBIOTIC ORAL)   11/24/23  Historical Provider, MD   mometasone (Elocon) 0.1 % lotion 2 times a day. 1/7/15 11/24/23  Historical Provider, MD         Relevant Results Recent labs reviewed in the EMR.  Lab Results   Component Value Date    HGB 13.5 11/08/2023    HGB 10.2 (L) 09/03/2023    HGB 11.5 (L) 08/31/2023    HGB 11.5 (L) 08/20/2023    MCV 86 11/08/2023    MCV 92 09/03/2023    MCV 89 08/31/2023    MCV 91 08/20/2023     11/08/2023     09/03/2023     08/31/2023     08/20/2023       No results found for: \"FERRITIN\", \"IRON\"    Lab Results   Component Value Date     09/03/2023    K 4.1 09/03/2023     (H) 09/03/2023    BUN 7 09/03/2023    CREATININE 0.73 09/03/2023       Lab Results   Component Value Date    BILITOT 0.2 09/03/2023     Lab Results   Component Value Date    ALT 13 09/03/2023    ALT 12 08/31/2023    ALT 11 08/20/2023    AST 33 09/03/2023    AST 22 08/31/2023    AST 19 08/20/2023    ALKPHOS 118 (H) 09/03/2023    ALKPHOS 148 (H) 08/31/2023    ALKPHOS 147 (H) 08/20/2023       No results found for: \"CRP\"    No results found for: \"CALPS\"    Radiology: Reviewed imaging reviewed in the EMR.  CT ABD/PEL W IVCON    Result Date: 11/10/2023  * * *Final Report* * * DATE OF EXAM: Nov 10 2023 12:24AM   [facility]   0530  -  CT ABD/PEL W IVCON  / ACCESSION #  [id number] PROCEDURE REASON: Abdominal abscess/infection suspected      * * * * Physician Interpretation * * * *  EXAMINATION:  CT ABDOMEN AND PELVIS WITH IV CONTRAST CLINICAL HISTORY: Abdominal pain, vaginal bleeding. TECHNIQUE: CT of the abdomen and pelvis was performed using standard technique, scanning from just above the dome of the diaphragm to the symphysis pubis. MQ:  CTAP_3 Contrast: IV:  100 ml of Omnipaque 350 CT Radiation dose: Integrated Dose-length product (DLP) for this visit =  "  477 mGy*cm. CT Dose Reduction Employed: Automated exposure control (AEC) COMPARISON: Pelvic ultrasound from earlier the same day. CT abdomen/pelvis 10/3/2021. RESULT: Liver: No mass. Biliary: No bile duct dilation.  Gallbladder is unremarkable. Spleen: No mass. No splenomegaly. Pancreas: No mass or duct dilation. Adrenals: No mass. Kidneys: No mass, calculus or hydronephrosis. GI tract: No dilation or wall thickening. Lymph nodes: No abdominal or pelvic lymphadenopathy. Mesentery/Peritoneum: No ascites or mass. Retroperitoneum: No mass. Vasculature: No aneurysm. Aorta and branch vessels are patent. Portal venous system is patent. Opacified hepatic veins and IVC appear patent. Pelvis: No mass, ascites or fluid collection. Tampon within the vagina. Bones/Soft Tissues: No acute or aggressive osseous lesion. Lower thorax: No acute process.  (topogram) images: No additional findings.    IMPRESSION: No acute abdominopelvic process. : [facility]   Transcribe Date/Time: Nov 10 2023 12:30A Dictated by : EVARISTO LIZARRAGA MD This examination was interpreted and the report reviewed and electronically signed by: EVARISTO LIZARRAGA MD on Nov 10 2023 12:36AM  EST    US FEMALE PELVIS TRANSVAG    Result Date: 11/9/2023  * * *Final Report* * * DATE OF EXAM: Nov 9 2023 10:50PM   U   1060  -  US FEMALE PELVIS TRANSVAG  / ACCESSION #  [id number] PROCEDURE REASON: Pelvic pain, negative beta-HCG, gyn etiology suspected      * * * * Physician Interpretation * * * *  EXAMINATION:   TRANSVAGINAL AND LIMITED TRANSABDOMINAL PELVIC ULTRASOUND CLINICAL HISTORY:  Pelvic pain, bleeding 2 months postpartum. TECHNIQUE: Sonography of the pelvis was performed by transvaginal and transabdominal (limited) techniques.  Images were obtained and stored in a permanent archive. MQ:  UFP_1 COMPARISON: 04/11/2022 RESULT: Uterus size: 6.9 x 4.7 x 3.7 cm      -Orientation: Anteverted      -Myometrium: Normal sonographic appearance.       -Endometrial echo complex: 0.8 cm with heterogenous echotexture      -Cervix: normal Right ovary: 2.8 x 1.6 x 1.6 cm  Normal sonographic appearance with physiologic follicles.   Arterial and venous flow is present throughout the ovary on color Doppler imaging with   normal spectral waveforms. Left ovary: 2.6 x 1.6 x 2.5 cm  Normal sonographic appearance with physiologic follicles.   Arterial and venous flow is present throughout the left ovary on color Doppler imaging with normal spectral waveforms. Pelvis free fluid: None.    IMPRESSION: Heterogenous echotexture of the endometrial complex, which may indicate blood product in the endometrial cavity.  Otherwise no acute findings. : [facility]   Transcribe Date/Time: Nov 9 2023 11:09P Dictated by : EVARISTO TAYLOR MD This examination was interpreted and the report reviewed and electronically signed by: EVARISTO TAYLOR MD on Nov 9 2023 11:11PM  EST    US FEMALE PELVIS TRANSABD LTD    Result Date: 11/9/2023  * * *Final Report* * * DATE OF EXAM: Nov 9 2023 10:50PM   U   1059  -  US FEMALE PELVIS TRANSABD  LTD  / ACCESSION #  [id number] PROCEDURE REASON: Pelvic pain, negative beta-HCG, gyn etiology suspected      * * * * Physician Interpretation * * * *  EXAMINATION:   TRANSVAGINAL AND LIMITED TRANSABDOMINAL PELVIC ULTRASOUND CLINICAL HISTORY:  Pelvic pain, bleeding 2 months postpartum. TECHNIQUE: Sonography of the pelvis was performed by transvaginal and transabdominal (limited) techniques.  Images were obtained and stored in a permanent archive. MQ:  UFP_1 COMPARISON: 04/11/2022 RESULT: Uterus size: 6.9 x 4.7 x 3.7 cm      -Orientation: Anteverted      -Myometrium: Normal sonographic appearance.      -Endometrial echo complex: 0.8 cm with heterogenous echotexture      -Cervix: normal Right ovary: 2.8 x 1.6 x 1.6 cm  Normal sonographic appearance with physiologic follicles.   Arterial and venous flow is present throughout the ovary on color Doppler imaging  with normal spectral waveforms. Left ovary: 2.6 x 1.6 x 2.5 cm  Normal sonographic appearance with physiologic follicles.   Arterial and venous flow is present throughout the left ovary on color Doppler imaging with normal spectral waveforms.   Pelvis free fluid: None.    IMPRESSION: Heterogenous echotexture of the endometrial complex, which may indicate blood product in the endometrial cavity.  Otherwise no acute findings. : [facility]   Transcribe Date/Time: Nov 9 2023 11:09P Dictated by : EVARISTO TAYLOR MD This examination was interpreted and the report reviewed and electronically signed by: EVARISTO TAYLOR MD on Nov 9 2023 11:11PM  EST    US PELVIS TRANSABDOMINAL WITH TRANSVAGINAL    Result Date: 11/8/2023  Interpreted By:  Glenn Norwood, STUDY: US PELVIS TRANSABDOMINAL WITH TRANSVAGINAL  11/8/2023 10:31 pm   INDICATION: 24 y/o   F with  Signs/Symptoms:pelvic pain   COMPARISON: None.   ACCESSION NUMBER(S): [id number]   ORDERING CLINICIAN: ZHOU DARBY   TECHNIQUE: Routine ultrasound of the pelvis was performed with duplex Doppler (color and spectral) evaluation.  Evaluation of the female pelvis was performed by transabdominal technique .  Static images were obtained for remote interpretation.   FINDINGS: Uterus is heterogeneous. Echogenic material within the endometrium. Query blood products. No hyperemia to suggest retained products. The uterus measures 4.4 by 4.6 x 8.7 cm. Endometrium is 1.2 cm. Both ovaries are normal in size echotexture and vascularity. No evidence of torsion.       Heterogeneous endometrium measuring 1.2 cm. This is not hyperemic to suggest retained products. I suspect some of this is blood products. If clinical symptoms do not resolve, further evaluation is advised. No torsion.   MACRO: None   Signed by: Glenn Norwood 11/8/2023 10:44 PM Dictation workstation:   [id number]

## 2023-11-30 ENCOUNTER — OFFICE VISIT (OUTPATIENT)
Dept: PRIMARY CARE | Facility: CLINIC | Age: 23
End: 2023-11-30
Payer: COMMERCIAL

## 2023-11-30 VITALS
OXYGEN SATURATION: 97 % | TEMPERATURE: 97.1 F | WEIGHT: 161.6 LBS | HEART RATE: 85 BPM | RESPIRATION RATE: 14 BRPM | HEIGHT: 63 IN | DIASTOLIC BLOOD PRESSURE: 77 MMHG | BODY MASS INDEX: 28.63 KG/M2 | SYSTOLIC BLOOD PRESSURE: 124 MMHG

## 2023-11-30 DIAGNOSIS — Z30.011 ENCOUNTER FOR ORAL CONTRACEPTION INITIAL PRESCRIPTION: ICD-10-CM

## 2023-11-30 DIAGNOSIS — Z13.29 THYROID DISORDER SCREEN: ICD-10-CM

## 2023-11-30 DIAGNOSIS — F33.1 MODERATE EPISODE OF RECURRENT MAJOR DEPRESSIVE DISORDER (MULTI): Primary | ICD-10-CM

## 2023-11-30 DIAGNOSIS — F43.10 PTSD (POST-TRAUMATIC STRESS DISORDER): ICD-10-CM

## 2023-11-30 DIAGNOSIS — Z23 ENCOUNTER FOR IMMUNIZATION: ICD-10-CM

## 2023-11-30 DIAGNOSIS — E55.9 VITAMIN D DEFICIENCY: ICD-10-CM

## 2023-11-30 DIAGNOSIS — E78.49 OTHER HYPERLIPIDEMIA: ICD-10-CM

## 2023-11-30 DIAGNOSIS — F41.1 GENERALIZED ANXIETY DISORDER: ICD-10-CM

## 2023-11-30 PROBLEM — H69.90 EUSTACHIAN TUBE DYSFUNCTION: Status: RESOLVED | Noted: 2023-10-13 | Resolved: 2023-11-30

## 2023-11-30 PROBLEM — H66.90 OTITIS MEDIA: Status: RESOLVED | Noted: 2023-10-13 | Resolved: 2023-11-30

## 2023-11-30 PROBLEM — Z13.220 SCREENING FOR HYPERLIPIDEMIA: Status: ACTIVE | Noted: 2023-11-30

## 2023-11-30 PROBLEM — O13.9 GESTATIONAL HYPERTENSION (HHS-HCC): Status: RESOLVED | Noted: 2023-10-13 | Resolved: 2023-11-30

## 2023-11-30 PROBLEM — Z30.40 ENCOUNTER FOR REFILL OF PRESCRIPTION FOR CONTRACEPTION: Status: ACTIVE | Noted: 2023-11-30

## 2023-11-30 PROBLEM — N76.0 VULVOVAGINITIS: Status: RESOLVED | Noted: 2023-10-13 | Resolved: 2023-11-30

## 2023-11-30 LAB — PREGNANCY TEST URINE, POC: NEGATIVE

## 2023-11-30 PROCEDURE — 99204 OFFICE O/P NEW MOD 45 MIN: CPT | Performed by: FAMILY MEDICINE

## 2023-11-30 PROCEDURE — 81025 URINE PREGNANCY TEST: CPT | Performed by: FAMILY MEDICINE

## 2023-11-30 PROCEDURE — 90471 IMMUNIZATION ADMIN: CPT | Performed by: FAMILY MEDICINE

## 2023-11-30 PROCEDURE — 1036F TOBACCO NON-USER: CPT | Performed by: FAMILY MEDICINE

## 2023-11-30 PROCEDURE — 90686 IIV4 VACC NO PRSV 0.5 ML IM: CPT | Performed by: FAMILY MEDICINE

## 2023-11-30 RX ORDER — POLYETHYLENE GLYCOL-3350 AND ELECTROLYTES 236; 6.74; 5.86; 2.97; 22.74 G/274.31G; G/274.31G; G/274.31G; G/274.31G; G/274.31G
POWDER, FOR SOLUTION ORAL
Status: ON HOLD | COMMUNITY
Start: 2023-11-24 | End: 2024-01-31 | Stop reason: ENTERED-IN-ERROR

## 2023-11-30 RX ORDER — DESOGESTREL AND ETHINYL ESTRADIOL 0.15-0.03
1 KIT ORAL DAILY
Qty: 28 TABLET | Refills: 12 | Status: ON HOLD | OUTPATIENT
Start: 2023-11-30 | End: 2024-03-27 | Stop reason: ALTCHOICE

## 2023-11-30 RX ORDER — FLUOXETINE HYDROCHLORIDE 20 MG/1
20 CAPSULE ORAL DAILY
Qty: 30 CAPSULE | Refills: 2 | Status: SHIPPED | OUTPATIENT
Start: 2023-11-30 | End: 2024-02-06 | Stop reason: SDUPTHER

## 2023-11-30 ASSESSMENT — ENCOUNTER SYMPTOMS
ABDOMINAL PAIN: 0
CHEST TIGHTNESS: 0
SHORTNESS OF BREATH: 0
FEVER: 0
PALPITATIONS: 0
ANAL BLEEDING: 1
CHILLS: 0
RECTAL PAIN: 1
ARTHRALGIAS: 0
CONFUSION: 0

## 2023-11-30 NOTE — ASSESSMENT & PLAN NOTE
Urine pregnancy test negative  Prescription for Apri oral contraceptive 28-day pill pack provided 1 pack with 1 year refills.  Patient advised that she should continue to follow with her gynecologist as well for well woman visits.

## 2023-11-30 NOTE — ASSESSMENT & PLAN NOTE
Continue Wellbutrin  mg daily along with fluoxetine 20 mg daily.  We also discussed consider going back into counseling which she has done in the past per her report.
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
## 2023-12-03 DIAGNOSIS — K21.9 GASTROESOPHAGEAL REFLUX DISEASE WITHOUT ESOPHAGITIS: ICD-10-CM

## 2023-12-03 DIAGNOSIS — R10.84 GENERALIZED ABDOMINAL PAIN: ICD-10-CM

## 2023-12-03 DIAGNOSIS — A60.00 GENITAL HERPES SIMPLEX, UNSPECIFIED SITE: Primary | ICD-10-CM

## 2023-12-04 RX ORDER — VALACYCLOVIR HYDROCHLORIDE 500 MG/1
500 TABLET, FILM COATED ORAL 2 TIMES DAILY
Qty: 20 TABLET | Refills: 0 | Status: SHIPPED | OUTPATIENT
Start: 2023-12-04 | End: 2023-12-14

## 2023-12-04 RX ORDER — ASCORBIC ACID, CHOLECALCIFEROL, .ALPHA.-TOCOPHEROL, DL-, FOLIC ACID, PYRIDOXINE HYDROCHLORIDE, CYANOCOBALAMIN, CALCIUM FORMATE, FERROUS ASPARTO GLYCINATE, MAGNESIUM OXIDE AND DOCONEXENT 90; 400; 40; 1; 26; 25; 155; 18; 50; 300 MG/1; [IU]/1; [IU]/1; MG/1; MG/1; UG/1; MG/1; MG/1; MG/1; MG/1
1 CAPSULE, GELATIN COATED ORAL DAILY
Qty: 30 CAPSULE | Refills: 3 | Status: SHIPPED | OUTPATIENT
Start: 2023-12-04 | End: 2024-01-03

## 2023-12-04 RX ORDER — DICYCLOMINE HYDROCHLORIDE 10 MG/1
CAPSULE ORAL
Refills: 0 | OUTPATIENT
Start: 2023-12-04

## 2023-12-04 RX ORDER — FAMOTIDINE 40 MG/1
40 TABLET, FILM COATED ORAL NIGHTLY
Qty: 30 TABLET | Refills: 3 | Status: SHIPPED | OUTPATIENT
Start: 2023-12-04 | End: 2024-02-06 | Stop reason: WASHOUT

## 2023-12-26 ENCOUNTER — TELEPHONE (OUTPATIENT)
Dept: OBSTETRICS AND GYNECOLOGY | Facility: CLINIC | Age: 23
End: 2023-12-26
Payer: COMMERCIAL

## 2023-12-26 NOTE — TELEPHONE ENCOUNTER
Patient states that she is on day 12 of her menstrual cycle, and it is still very consistent and does not show any signs of stopping. Would like to know if this is a problem and what she should do. Please advise.

## 2023-12-28 NOTE — TELEPHONE ENCOUNTER
Patient informed of Dr. Mayo's recommendations and patient requesting appointment to discuss cycle control.

## 2024-01-03 ENCOUNTER — APPOINTMENT (OUTPATIENT)
Dept: GASTROENTEROLOGY | Facility: HOSPITAL | Age: 24
End: 2024-01-03
Payer: COMMERCIAL

## 2024-01-15 ENCOUNTER — APPOINTMENT (OUTPATIENT)
Dept: PRIMARY CARE | Facility: CLINIC | Age: 24
End: 2024-01-15
Payer: COMMERCIAL

## 2024-01-16 ENCOUNTER — LAB (OUTPATIENT)
Dept: LAB | Facility: LAB | Age: 24
End: 2024-01-16
Payer: COMMERCIAL

## 2024-01-16 DIAGNOSIS — F33.1 MODERATE EPISODE OF RECURRENT MAJOR DEPRESSIVE DISORDER (MULTI): ICD-10-CM

## 2024-01-16 DIAGNOSIS — Z13.29 THYROID DISORDER SCREEN: ICD-10-CM

## 2024-01-16 DIAGNOSIS — E55.9 VITAMIN D DEFICIENCY: ICD-10-CM

## 2024-01-16 DIAGNOSIS — F41.1 GENERALIZED ANXIETY DISORDER: ICD-10-CM

## 2024-01-16 DIAGNOSIS — E78.49 OTHER HYPERLIPIDEMIA: ICD-10-CM

## 2024-01-16 LAB
25(OH)D3 SERPL-MCNC: 29 NG/ML (ref 30–100)
ALBUMIN SERPL BCP-MCNC: 4 G/DL (ref 3.4–5)
ALP SERPL-CCNC: 51 U/L (ref 33–110)
ALT SERPL W P-5'-P-CCNC: 14 U/L (ref 7–45)
ANION GAP SERPL CALC-SCNC: 12 MMOL/L (ref 10–20)
AST SERPL W P-5'-P-CCNC: 17 U/L (ref 9–39)
BILIRUB SERPL-MCNC: 0.4 MG/DL (ref 0–1.2)
BUN SERPL-MCNC: 12 MG/DL (ref 6–23)
CALCIUM SERPL-MCNC: 8.9 MG/DL (ref 8.6–10.3)
CHLORIDE SERPL-SCNC: 105 MMOL/L (ref 98–107)
CHOLEST SERPL-MCNC: 162 MG/DL (ref 0–199)
CHOLESTEROL/HDL RATIO: 2.6
CO2 SERPL-SCNC: 24 MMOL/L (ref 21–32)
CREAT SERPL-MCNC: 0.81 MG/DL (ref 0.5–1.05)
EGFRCR SERPLBLD CKD-EPI 2021: >90 ML/MIN/1.73M*2
ERYTHROCYTE [DISTWIDTH] IN BLOOD BY AUTOMATED COUNT: 13.1 % (ref 11.5–14.5)
GLUCOSE SERPL-MCNC: 84 MG/DL (ref 74–99)
HCT VFR BLD AUTO: 40.4 % (ref 36–46)
HDLC SERPL-MCNC: 63 MG/DL
HGB BLD-MCNC: 13.3 G/DL (ref 12–16)
LDLC SERPL CALC-MCNC: 83 MG/DL
LDLC SERPL DIRECT ASSAY-MCNC: 92 MG/DL (ref 0–129)
MCH RBC QN AUTO: 29.6 PG (ref 26–34)
MCHC RBC AUTO-ENTMCNC: 32.9 G/DL (ref 32–36)
MCV RBC AUTO: 90 FL (ref 80–100)
NON HDL CHOLESTEROL: 99 MG/DL (ref 0–149)
NRBC BLD-RTO: 0 /100 WBCS (ref 0–0)
PLATELET # BLD AUTO: 381 X10*3/UL (ref 150–450)
POTASSIUM SERPL-SCNC: 4 MMOL/L (ref 3.5–5.3)
PROT SERPL-MCNC: 6.6 G/DL (ref 6.4–8.2)
RBC # BLD AUTO: 4.5 X10*6/UL (ref 4–5.2)
SODIUM SERPL-SCNC: 137 MMOL/L (ref 136–145)
TRIGL SERPL-MCNC: 81 MG/DL (ref 0–149)
TSH SERPL-ACNC: 0.75 MIU/L (ref 0.44–3.98)
VLDL: 16 MG/DL (ref 0–40)
WBC # BLD AUTO: 8.5 X10*3/UL (ref 4.4–11.3)

## 2024-01-16 PROCEDURE — 85027 COMPLETE CBC AUTOMATED: CPT

## 2024-01-16 PROCEDURE — 80061 LIPID PANEL: CPT

## 2024-01-16 PROCEDURE — 36415 COLL VENOUS BLD VENIPUNCTURE: CPT

## 2024-01-16 PROCEDURE — 83721 ASSAY OF BLOOD LIPOPROTEIN: CPT

## 2024-01-16 PROCEDURE — 82306 VITAMIN D 25 HYDROXY: CPT

## 2024-01-16 PROCEDURE — 84443 ASSAY THYROID STIM HORMONE: CPT

## 2024-01-16 PROCEDURE — 80053 COMPREHEN METABOLIC PANEL: CPT

## 2024-01-17 ENCOUNTER — APPOINTMENT (OUTPATIENT)
Dept: PRIMARY CARE | Facility: CLINIC | Age: 24
End: 2024-01-17
Payer: COMMERCIAL

## 2024-01-29 ENCOUNTER — APPOINTMENT (OUTPATIENT)
Dept: RADIOLOGY | Facility: HOSPITAL | Age: 24
End: 2024-01-29
Payer: COMMERCIAL

## 2024-01-29 ENCOUNTER — HOSPITAL ENCOUNTER (EMERGENCY)
Facility: HOSPITAL | Age: 24
Discharge: LEFT BEFORE DISCHARGE | End: 2024-01-29
Payer: COMMERCIAL

## 2024-01-29 VITALS
RESPIRATION RATE: 20 BRPM | WEIGHT: 150 LBS | HEART RATE: 79 BPM | BODY MASS INDEX: 26.58 KG/M2 | SYSTOLIC BLOOD PRESSURE: 120 MMHG | DIASTOLIC BLOOD PRESSURE: 85 MMHG | TEMPERATURE: 97.6 F | HEIGHT: 63 IN | OXYGEN SATURATION: 99 %

## 2024-01-29 PROCEDURE — 4500999001 HC ED NO CHARGE: Performed by: NURSE PRACTITIONER

## 2024-01-29 PROCEDURE — 99281 EMR DPT VST MAYX REQ PHY/QHP: CPT

## 2024-01-29 ASSESSMENT — PAIN SCALES - GENERAL: PAINLEVEL_OUTOF10: 9

## 2024-01-29 ASSESSMENT — PAIN DESCRIPTION - ORIENTATION: ORIENTATION: RIGHT;LEFT;MID;LOWER

## 2024-01-29 ASSESSMENT — PAIN DESCRIPTION - PAIN TYPE: TYPE: ACUTE PAIN

## 2024-01-29 ASSESSMENT — PAIN - FUNCTIONAL ASSESSMENT: PAIN_FUNCTIONAL_ASSESSMENT: 0-10

## 2024-01-29 ASSESSMENT — PAIN DESCRIPTION - DESCRIPTORS: DESCRIPTORS: CRAMPING;TIGHTNESS

## 2024-01-29 ASSESSMENT — PAIN DESCRIPTION - FREQUENCY: FREQUENCY: CONSTANT/CONTINUOUS

## 2024-01-29 ASSESSMENT — COLUMBIA-SUICIDE SEVERITY RATING SCALE - C-SSRS
6. HAVE YOU EVER DONE ANYTHING, STARTED TO DO ANYTHING, OR PREPARED TO DO ANYTHING TO END YOUR LIFE?: NO
1. IN THE PAST MONTH, HAVE YOU WISHED YOU WERE DEAD OR WISHED YOU COULD GO TO SLEEP AND NOT WAKE UP?: NO
2. HAVE YOU ACTUALLY HAD ANY THOUGHTS OF KILLING YOURSELF?: NO

## 2024-01-29 ASSESSMENT — PAIN DESCRIPTION - LOCATION: LOCATION: ABDOMEN

## 2024-01-30 ENCOUNTER — HOSPITAL ENCOUNTER (OUTPATIENT)
Facility: HOSPITAL | Age: 24
Setting detail: OBSERVATION
LOS: 1 days | Discharge: HOME | End: 2024-02-02
Attending: INTERNAL MEDICINE | Admitting: INTERNAL MEDICINE
Payer: COMMERCIAL

## 2024-01-30 DIAGNOSIS — K52.9 COLITIS: ICD-10-CM

## 2024-01-30 DIAGNOSIS — K92.2 GASTROINTESTINAL HEMORRHAGE, UNSPECIFIED GASTROINTESTINAL HEMORRHAGE TYPE: Primary | ICD-10-CM

## 2024-01-30 LAB
C DIF TOX TCDA+TCDB STL QL NAA+PROBE: NOT DETECTED
CRP SERPL-MCNC: 2.63 MG/DL
LACTATE SERPL-SCNC: 0.7 MMOL/L (ref 0.4–2)
LDH SERPL L TO P-CCNC: 157 U/L (ref 84–246)

## 2024-01-30 PROCEDURE — 86140 C-REACTIVE PROTEIN: CPT | Performed by: INTERNAL MEDICINE

## 2024-01-30 PROCEDURE — 83993 ASSAY FOR CALPROTECTIN FECAL: CPT | Performed by: INTERNAL MEDICINE

## 2024-01-30 PROCEDURE — 36415 COLL VENOUS BLD VENIPUNCTURE: CPT | Performed by: INTERNAL MEDICINE

## 2024-01-30 PROCEDURE — 83605 ASSAY OF LACTIC ACID: CPT | Performed by: INTERNAL MEDICINE

## 2024-01-30 PROCEDURE — 83615 LACTATE (LD) (LDH) ENZYME: CPT | Performed by: INTERNAL MEDICINE

## 2024-01-30 PROCEDURE — 2500000001 HC RX 250 WO HCPCS SELF ADMINISTERED DRUGS (ALT 637 FOR MEDICARE OP): Performed by: INTERNAL MEDICINE

## 2024-01-30 PROCEDURE — 99222 1ST HOSP IP/OBS MODERATE 55: CPT | Performed by: INTERNAL MEDICINE

## 2024-01-30 PROCEDURE — 96376 TX/PRO/DX INJ SAME DRUG ADON: CPT

## 2024-01-30 PROCEDURE — 87506 IADNA-DNA/RNA PROBE TQ 6-11: CPT | Mod: PORLAB | Performed by: INTERNAL MEDICINE

## 2024-01-30 PROCEDURE — G0378 HOSPITAL OBSERVATION PER HR: HCPCS

## 2024-01-30 PROCEDURE — 96375 TX/PRO/DX INJ NEW DRUG ADDON: CPT

## 2024-01-30 PROCEDURE — 2500000004 HC RX 250 GENERAL PHARMACY W/ HCPCS (ALT 636 FOR OP/ED): Performed by: INTERNAL MEDICINE

## 2024-01-30 PROCEDURE — 96374 THER/PROPH/DIAG INJ IV PUSH: CPT

## 2024-01-30 PROCEDURE — 87493 C DIFF AMPLIFIED PROBE: CPT | Performed by: INTERNAL MEDICINE

## 2024-01-30 PROCEDURE — C9113 INJ PANTOPRAZOLE SODIUM, VIA: HCPCS | Performed by: INTERNAL MEDICINE

## 2024-01-30 RX ORDER — BUPROPION HYDROCHLORIDE 150 MG/1
300 TABLET ORAL DAILY
Status: DISCONTINUED | OUTPATIENT
Start: 2024-01-30 | End: 2024-02-02 | Stop reason: HOSPADM

## 2024-01-30 RX ORDER — OXYCODONE HYDROCHLORIDE 5 MG/1
5 TABLET ORAL EVERY 8 HOURS PRN
Status: DISCONTINUED | OUTPATIENT
Start: 2024-01-30 | End: 2024-02-02 | Stop reason: HOSPADM

## 2024-01-30 RX ORDER — ENOXAPARIN SODIUM 100 MG/ML
40 INJECTION SUBCUTANEOUS EVERY 24 HOURS
Status: DISCONTINUED | OUTPATIENT
Start: 2024-01-30 | End: 2024-01-30

## 2024-01-30 RX ORDER — SENNOSIDES 8.6 MG/1
2 TABLET ORAL 2 TIMES DAILY
Status: DISCONTINUED | OUTPATIENT
Start: 2024-01-30 | End: 2024-01-30

## 2024-01-30 RX ORDER — ACETAMINOPHEN 650 MG/1
650 SUPPOSITORY RECTAL EVERY 4 HOURS PRN
Status: DISCONTINUED | OUTPATIENT
Start: 2024-01-30 | End: 2024-02-02 | Stop reason: HOSPADM

## 2024-01-30 RX ORDER — TALC
6 POWDER (GRAM) TOPICAL NIGHTLY PRN
Status: DISCONTINUED | OUTPATIENT
Start: 2024-01-30 | End: 2024-02-02 | Stop reason: HOSPADM

## 2024-01-30 RX ORDER — PANTOPRAZOLE SODIUM 40 MG/10ML
40 INJECTION, POWDER, LYOPHILIZED, FOR SOLUTION INTRAVENOUS 2 TIMES DAILY
Status: DISCONTINUED | OUTPATIENT
Start: 2024-01-30 | End: 2024-02-02 | Stop reason: HOSPADM

## 2024-01-30 RX ORDER — DICYCLOMINE HYDROCHLORIDE 10 MG/1
10 CAPSULE ORAL 4 TIMES DAILY PRN
Status: DISCONTINUED | OUTPATIENT
Start: 2024-01-30 | End: 2024-02-02 | Stop reason: HOSPADM

## 2024-01-30 RX ORDER — ACETAMINOPHEN 325 MG/1
650 TABLET ORAL EVERY 4 HOURS PRN
Status: DISCONTINUED | OUTPATIENT
Start: 2024-01-30 | End: 2024-02-02 | Stop reason: HOSPADM

## 2024-01-30 RX ORDER — FAMOTIDINE 20 MG/1
40 TABLET, FILM COATED ORAL NIGHTLY
Status: DISCONTINUED | OUTPATIENT
Start: 2024-01-30 | End: 2024-01-30

## 2024-01-30 RX ORDER — FLUOXETINE HYDROCHLORIDE 20 MG/1
20 CAPSULE ORAL DAILY
Status: DISCONTINUED | OUTPATIENT
Start: 2024-01-30 | End: 2024-02-02 | Stop reason: HOSPADM

## 2024-01-30 RX ORDER — ACETAMINOPHEN 160 MG/5ML
650 SOLUTION ORAL EVERY 4 HOURS PRN
Status: DISCONTINUED | OUTPATIENT
Start: 2024-01-30 | End: 2024-02-02 | Stop reason: HOSPADM

## 2024-01-30 RX ORDER — OXYCODONE HYDROCHLORIDE 5 MG/1
5 TABLET ORAL EVERY 4 HOURS PRN
Status: DISCONTINUED | OUTPATIENT
Start: 2024-01-30 | End: 2024-02-02 | Stop reason: HOSPADM

## 2024-01-30 RX ORDER — SODIUM CHLORIDE 9 MG/ML
75 INJECTION, SOLUTION INTRAVENOUS CONTINUOUS
Status: DISCONTINUED | OUTPATIENT
Start: 2024-01-30 | End: 2024-02-02 | Stop reason: HOSPADM

## 2024-01-30 RX ADMIN — BUPROPION HYDROCHLORIDE 300 MG: 150 TABLET, FILM COATED, EXTENDED RELEASE ORAL at 11:26

## 2024-01-30 RX ADMIN — HYDROMORPHONE HYDROCHLORIDE 0.2 MG: 0.2 INJECTION, SOLUTION INTRAMUSCULAR; INTRAVENOUS; SUBCUTANEOUS at 14:26

## 2024-01-30 RX ADMIN — Medication 6 MG: at 22:21

## 2024-01-30 RX ADMIN — PANTOPRAZOLE SODIUM 40 MG: 40 INJECTION, POWDER, FOR SOLUTION INTRAVENOUS at 22:21

## 2024-01-30 RX ADMIN — HYDROMORPHONE HYDROCHLORIDE 0.2 MG: 0.2 INJECTION, SOLUTION INTRAMUSCULAR; INTRAVENOUS; SUBCUTANEOUS at 11:26

## 2024-01-30 RX ADMIN — FLUOXETINE HYDROCHLORIDE 20 MG: 20 CAPSULE ORAL at 11:26

## 2024-01-30 RX ADMIN — PANTOPRAZOLE SODIUM 40 MG: 40 INJECTION, POWDER, FOR SOLUTION INTRAVENOUS at 11:27

## 2024-01-30 RX ADMIN — SODIUM CHLORIDE 75 ML/HR: 9 INJECTION, SOLUTION INTRAVENOUS at 23:01

## 2024-01-30 RX ADMIN — OXYCODONE HYDROCHLORIDE 5 MG: 5 TABLET ORAL at 18:33

## 2024-01-30 RX ADMIN — OXYCODONE HYDROCHLORIDE 5 MG: 5 TABLET ORAL at 22:21

## 2024-01-30 RX ADMIN — SODIUM CHLORIDE 75 ML/HR: 9 INJECTION, SOLUTION INTRAVENOUS at 11:28

## 2024-01-30 SDOH — SOCIAL STABILITY: SOCIAL INSECURITY: HAVE YOU HAD THOUGHTS OF HARMING ANYONE ELSE?: NO

## 2024-01-30 SDOH — SOCIAL STABILITY: SOCIAL INSECURITY: ABUSE: ADULT

## 2024-01-30 SDOH — SOCIAL STABILITY: SOCIAL INSECURITY: HAS ANYONE EVER THREATENED TO HURT YOUR FAMILY OR YOUR PETS?: NO

## 2024-01-30 SDOH — SOCIAL STABILITY: SOCIAL INSECURITY: WERE YOU ABLE TO COMPLETE ALL THE BEHAVIORAL HEALTH SCREENINGS?: YES

## 2024-01-30 SDOH — SOCIAL STABILITY: SOCIAL INSECURITY: DO YOU FEEL UNSAFE GOING BACK TO THE PLACE WHERE YOU ARE LIVING?: NO

## 2024-01-30 SDOH — SOCIAL STABILITY: SOCIAL INSECURITY: ARE YOU OR HAVE YOU BEEN THREATENED OR ABUSED PHYSICALLY, EMOTIONALLY, OR SEXUALLY BY ANYONE?: YES

## 2024-01-30 SDOH — SOCIAL STABILITY: SOCIAL INSECURITY: DO YOU FEEL ANYONE HAS EXPLOITED OR TAKEN ADVANTAGE OF YOU FINANCIALLY OR OF YOUR PERSONAL PROPERTY?: NO

## 2024-01-30 SDOH — SOCIAL STABILITY: SOCIAL INSECURITY: ARE THERE ANY APPARENT SIGNS OF INJURIES/BEHAVIORS THAT COULD BE RELATED TO ABUSE/NEGLECT?: NO

## 2024-01-30 SDOH — SOCIAL STABILITY: SOCIAL INSECURITY: DOES ANYONE TRY TO KEEP YOU FROM HAVING/CONTACTING OTHER FRIENDS OR DOING THINGS OUTSIDE YOUR HOME?: NO

## 2024-01-30 ASSESSMENT — COGNITIVE AND FUNCTIONAL STATUS - GENERAL
DAILY ACTIVITIY SCORE: 24
PATIENT BASELINE BEDBOUND: NO
MOBILITY SCORE: 24
DAILY ACTIVITIY SCORE: 24
MOBILITY SCORE: 24

## 2024-01-30 ASSESSMENT — ACTIVITIES OF DAILY LIVING (ADL)
DRESSING YOURSELF: INDEPENDENT
WALKS IN HOME: INDEPENDENT
HEARING - RIGHT EAR: FUNCTIONAL
LACK_OF_TRANSPORTATION: NO
BATHING: INDEPENDENT
FEEDING YOURSELF: INDEPENDENT
PATIENT'S MEMORY ADEQUATE TO SAFELY COMPLETE DAILY ACTIVITIES?: YES
HEARING - LEFT EAR: FUNCTIONAL
JUDGMENT_ADEQUATE_SAFELY_COMPLETE_DAILY_ACTIVITIES: YES
TOILETING: INDEPENDENT
GROOMING: INDEPENDENT
ADEQUATE_TO_COMPLETE_ADL: YES

## 2024-01-30 ASSESSMENT — LIFESTYLE VARIABLES
AUDIT-C TOTAL SCORE: 0
AUDIT-C TOTAL SCORE: 0
HOW OFTEN DO YOU HAVE A DRINK CONTAINING ALCOHOL: NEVER
HOW MANY STANDARD DRINKS CONTAINING ALCOHOL DO YOU HAVE ON A TYPICAL DAY: PATIENT DOES NOT DRINK
HOW OFTEN DO YOU HAVE 6 OR MORE DRINKS ON ONE OCCASION: NEVER
SKIP TO QUESTIONS 9-10: 1

## 2024-01-30 ASSESSMENT — ENCOUNTER SYMPTOMS
BLOOD IN STOOL: 1
MUSCULOSKELETAL NEGATIVE: 1
DIARRHEA: 1
RESPIRATORY NEGATIVE: 1
CONSTITUTIONAL NEGATIVE: 1
ABDOMINAL PAIN: 1
ENDOCRINE NEGATIVE: 1
NAUSEA: 1
CARDIOVASCULAR NEGATIVE: 1
ALLERGIC/IMMUNOLOGIC NEGATIVE: 1
PSYCHIATRIC NEGATIVE: 1
EYES NEGATIVE: 1

## 2024-01-30 ASSESSMENT — COLUMBIA-SUICIDE SEVERITY RATING SCALE - C-SSRS
6. HAVE YOU EVER DONE ANYTHING, STARTED TO DO ANYTHING, OR PREPARED TO DO ANYTHING TO END YOUR LIFE?: NO
2. HAVE YOU ACTUALLY HAD ANY THOUGHTS OF KILLING YOURSELF?: NO
1. IN THE PAST MONTH, HAVE YOU WISHED YOU WERE DEAD OR WISHED YOU COULD GO TO SLEEP AND NOT WAKE UP?: NO

## 2024-01-30 ASSESSMENT — PAIN - FUNCTIONAL ASSESSMENT
PAIN_FUNCTIONAL_ASSESSMENT: 0-10
PAIN_FUNCTIONAL_ASSESSMENT: 0-10

## 2024-01-30 ASSESSMENT — PAIN SCALES - GENERAL
PAINLEVEL_OUTOF10: 0 - NO PAIN
PAINLEVEL_OUTOF10: 7

## 2024-01-30 ASSESSMENT — PATIENT HEALTH QUESTIONNAIRE - PHQ9
2. FEELING DOWN, DEPRESSED OR HOPELESS: NOT AT ALL
SUM OF ALL RESPONSES TO PHQ9 QUESTIONS 1 & 2: 0
1. LITTLE INTEREST OR PLEASURE IN DOING THINGS: NOT AT ALL

## 2024-01-30 NOTE — CARE PLAN
Problem: Pain  Goal: Takes deep breaths with improved pain control throughout the shift  Outcome: Progressing  Goal: Turns in bed with improved pain control throughout the shift  Outcome: Progressing  Goal: Walks with improved pain control throughout the shift  Outcome: Progressing  Goal: Performs ADL's with improved pain control throughout shift  Outcome: Progressing  Goal: Participates in PT with improved pain control throughout the shift  Outcome: Progressing  Goal: Free from opioid side effects throughout the shift  Outcome: Progressing  Goal: Free from acute confusion related to pain meds throughout the shift  Outcome: Progressing   The patient's goals for the shift include      The clinical goals for the shift include Pt to maintain a pain of less than 5

## 2024-01-30 NOTE — CONSULTS
"Inpatient consult to gastroenterology  Consult performed by: Sergey Bailey MD  Consult ordered by: Asad Pelayo MD          Reason For Consult  \"GI bleeding\"      [facility] Gastroenterology Consultation Note    ASSESSMENT and PLAN:       Melanie Lance is a 23 y.o. female with a significant past medical history of PTSD, anxiety/depression, and IBS who presented with nausea/vomiting, abdominal pain, diarrhea, and rectal bleeding. GI was consulted for \"GI bleeding\".       Nausea/vomiting, diarrhea, abdominal pain, and rectal bleeding in setting of COVID19 infection  Suspect infectious cause of symptoms including possible COVID19. Other infectious cause possible as well. Also possible that she had dehydration or hypovolemia related to COVID which has led to ischemic colitis. Imaging findings are nonspecific and likely related to underdistension. Underlying IBD less likely. Previously EGD/colonoscopy was recommend and ultimately she will need those done, but no emergent indication at this time and Hgb remains normal. In setting of COVID would prefer to hold off on endoscopy, but will monitor clinical course. Will order labs to evaluate for infectious causes and will also check lactate which was not done at the OSH ER. Will check inflammatory markers (CRP and calprotectin), but would be difficult to interpret an elevated result in the setting of confirmed COVID.  - labs ordered  - IV fluids and treatment of COVID per IMS  - nursing should document BMs including number and color of BMs (descriptions of BMs as \"bloody\" are not helpful)          Sergey Bailey MD        Gastroenterology    [facility]    Clinical   [facility]        HISTORY OF PRESENT ILLNESS:     History Of Present Illness:    Melanie Lance is a 23 y.o. female with a significant past medical history of PTSD, anxiety/depression, and IBS who presented with " "nausea/vomiting, abdominal pain, diarrhea, and rectal bleeding. GI was consulted for \"GI bleeding\".      She says that starting yesterday she had nausea/vomiting, periumbilical pain, and diarrhea. She says that these symptoms are similar to previous GI symptoms, but seemed to start suddenly. Her pain is periumbilical and diffuse. She describes cramping and sharp pain. No hematemesis. She says that she isn't sure if there was blood in her stool initially, but after one of the subsequent BMs she noticed some red blood in her stool and then subsequent BMs have been mainly red blood. She says that bleeding in the past has been heavy at times, but has been mostly on the toilet paper or mixed with stool and this seemed different. She denies any ill contacts. She does wonder if maybe she was exposed to raw chicken/eggs recently through cooking. No recent antibiotics or medication changes.    At F labs showed no anemia (Hgb 13.3 g/dL), leukocytosis (16), unremarkable CMP, normal lipase, and positive COVID. Lactate was not checked. CT (without contrast) showed possible descending colon wall thickening, but limited by lack of contrast and decompression. Interestingly the admission H&P states that none of these results (labs, imaging, colonoscopy) are available. Previous CT with contrast in November was normal.    She was previously seen by GI (Dr. Eason) on 11/24/2023 at which time she complained of abdominal pain, nausea/vomiting, diarrhea, and rectal bleeding. At that time EGD/colonoscopy was recommended and scheduled, but not done because she had cancelled that appointment.      Endoscopy History:  12/27/2021 - Colonoscopy: normal TI, internal hemorrhoids otherwise normal colon      Review of systems:     Patient denies any heartburn/GERD, N/V, dysphagia, odynophagia, diarrhea, constipation, hematemesis, melena, or weight loss.    I performed a complete 10 point review of systems and it is negative except as noted in " "HPI or above. All other systems have been reviewed and are negative.        PAST HISTORIES:       Past Medical History:  She has a past medical history of Depression, Gestational hypertension (10/13/2023), and Personal history of other diseases of the respiratory system (01/02/2015).    Past Surgical History:  She has a past surgical history that includes Tympanostomy tube placement (Bilateral); Eye surgery; and Upper gastrointestinal endoscopy.      Social History:  She reports that she has never smoked. She has never been exposed to tobacco smoke. She has never used smokeless tobacco. She reports current alcohol use. She reports that she does not use drugs.    Family History:  No known GI disease, specifically denies pancreatitis, Crohn's, colon cancer, gastroesophageal cancer, or ulcerative colitis.    Family History   Problem Relation Name Age of Onset    Testicular cancer Father      Other (mental disorder) Father      Hypertension Father      Other (small cell lung cancer) Maternal Grandmother      Breast cancer Maternal Grandmother      Other (Cardiac disorder) Maternal Grandfather      Other (Cardiac disorder) Paternal Grandmother      Other (Cardiac disorder) Paternal Grandfather      Diabetes Paternal Grandfather          Allergies:  Amoxicillin, Azithromycin, Cefdinir, and Miconazole      OBJECTIVE:       Last Recorded Vitals:  Vitals:    01/30/24 1300 01/30/24 1310 01/30/24 1312   BP: 124/79 130/82 104/73   BP Location: Left arm Left arm Left arm   Patient Position: Lying Sitting Standing   Pulse: 102 (!) 119 (!) 124   Resp: 16 16 16   Temp: 37.2 °C (99 °F)     TempSrc: Temporal     SpO2:  95%    Weight: 68 kg (150 lb)     Height: 1.6 m (5' 2.99\")       /73 (BP Location: Left arm, Patient Position: Standing)   Pulse (!) 124   Temp 37.2 °C (99 °F) (Temporal)   Resp 16   Ht 1.6 m (5' 2.99\")   Wt 68 kg (150 lb)   LMP 01/22/2024   SpO2 95%   BMI 26.58 kg/m²      Physical Exam:    Physical " Exam  Vitals reviewed.   Constitutional:       General: She is not in acute distress.     Appearance: She is not ill-appearing.   HENT:      Head: Normocephalic and atraumatic.   Eyes:      General: No scleral icterus.  Cardiovascular:      Rate and Rhythm: Regular rhythm. Tachycardia present.      Pulses: Normal pulses.      Heart sounds: Normal heart sounds. No murmur heard.  Pulmonary:      Effort: Pulmonary effort is normal. No respiratory distress.      Breath sounds: Normal breath sounds. No wheezing.   Abdominal:      General: Bowel sounds are normal.      Palpations: Abdomen is soft.      Tenderness: There is abdominal tenderness (diffuse). There is no rebound.   Musculoskeletal:         General: No swelling or deformity.   Skin:     General: Skin is warm and dry.      Coloration: Skin is not jaundiced.      Findings: No rash.   Neurological:      General: No focal deficit present.      Mental Status: She is alert and oriented to person, place, and time.   Psychiatric:         Mood and Affect: Mood normal.         Behavior: Behavior normal.         Thought Content: Thought content normal.         Judgment: Judgment normal.           Inpatient Medications:  buPROPion XL, 300 mg, oral, Daily  FLUoxetine, 20 mg, oral, Daily  pantoprazole, 40 mg, intravenous, BID      PRN medications: dicyclomine, oxyCODONE    Outpatient Medications:  Prior to Admission medications    Medication Sig Start Date End Date Taking? Authorizing Provider   buPROPion XL (Wellbutrin XL) 300 mg 24 hr tablet Take 1 tablet (300 mg) by mouth once daily. 7/6/23   Historical Provider, MD   desogestreL-ethinyl estradioL (Apri) 0.15-0.03 mg tablet Take 1 tablet by mouth once daily. 11/30/23 11/29/24  Kamar Cooper MD   dicyclomine (Bentyl) 10 mg capsule Take 1 capsule (10 mg) by mouth 4 times a day as needed (abdominal pain). 11/24/23 11/23/24  Raymond Eason DO   famotidine (Pepcid) 40 mg tablet Take 1 tablet (40 mg) by mouth once  "daily at bedtime. 12/4/23 4/2/24  Clara Mayo MD   FLUoxetine (PROzac) 20 mg capsule Take 1 capsule (20 mg) by mouth once daily. 11/30/23 2/28/24  Kamar Cooper MD   DilmaLyte-ROSARIO 236-22.74-6.74 -5.86 gram solution  11/24/23   Historical Provider, MD       LABS AND IMAGING:     Labs:  Recent labs reviewed in the EMR.    No results found for: \"WBC\", \"HGB\", \"MCV\", \"PLT\", \"IRON\", \"TIBC\", \"IRONSAT\", \"FERRITIN\", \"MYTKHTFZ82\", \"FOLATE\"    No results found for: \"NA\", \"K\", \"CL\", \"BUN\", \"CREATININE\"    No results found for: \"BILITOT\", \"BILIDIR\", \"ALKPHOS\", \"AST\", \"ALT\", \"LIPASE\"    No results found for: \"CRP\", \"CALPS\"      Imaging:  CT ABD/PEL WO IVCON    Result Date: 1/30/2024  * * *Final Report* * * DATE OF EXAM: Jan 30 2024  3:41AM   [facility]   0531  -  CT ABD/PEL WO IVCON  / ACCESSION #  [id number] PROCEDURE REASON: Nausea/vomiting      * * * * Physician Interpretation * * * *  EXAMINATION:  CT ABDOMEN AND PELVIS WITHOUT IV CONTRAST CLINICAL HISTORY:  Abdominal pain, nausea/vomiting, diarrhea TECHNIQUE: Non-IV contrast imaging of the abdomen and pelvis was performed using standard technique, scanning from just above the dome of the diaphragm to the symphysis pubis.  Unenhanced imaging is limited for the evaluation of some intra-abdominal and pelvic pathology. MQ:  [id number]_3 Contrast: IV: None : ml of CT Radiation dose: Integrated Dose-length product (DLP) for this visit =   410 mGy*cm. CT Dose Reduction Employed: Automated exposure control (AEC) COMPARISON: Abdominal CT 11/10/2023. RESULT: Solid abdominal organs: Unremarkable. Biliary: Unremarkable. GI tract and mesentery: No bowel obstruction.  Descending colon appears mildly thickened, although limited due to decompression. Normal appendix. Peritoneal cavity: No free air or free fluid. Retroperitoneum: Unremarkable. Vasculature: Unremarkable. Pelvis: Unremarkable. Bones/Soft Tissues: No acute abnormality. Lower thorax: Unremarkable.    IMPRESSION: Suspected mild " nonspecific left colitis. : BOSSMAN   Transcribe Date/Time: Jan 30 2024  3:41A Dictated by : DANIA AMBROSE MD This examination was interpreted and the report reviewed and electronically signed by: DANIA AMBROSE MD on Jan 30 2024  3:49AM  EST

## 2024-01-30 NOTE — H&P
History Of Present Illness  Melanie Lance is a 23 y.o. female with past medical history of PTSD, IBS, depression, generalized anxiety disorder, transferred from [facility] secondary to bright red blood per rectum.  Patient states she also has history of hemorrhoids in the past and has had colonoscopy a couple years ago.  She is postpartum 5 months ago.  He states around 7 PM last night she started having bloody bowel movements with diarrhea nausea and vomiting.  She also passed out during those episodes of vomiting.  She also reports abdominal pain generalized.  The patient also reports intermittent use of NSAIDs.  She states about once a week.  She states she has had on and off blood in her stools at times tarry stools.  The patient denies any fever or chills cough or chest pain at this time.  Signout report states there is a possibility she is positive for COVID.  Will test her for COVID as no records from [facility] are available.  The patient is admitted for further care and management.      Past Medical History  Past Medical History:   Diagnosis Date    Depression     Gestational hypertension 10/13/2023    Personal history of other diseases of the respiratory system 01/02/2015    History of acute bacterial sinusitis       Surgical History  Past Surgical History:   Procedure Laterality Date    EYE SURGERY      TYMPANOSTOMY TUBE PLACEMENT Bilateral     UPPER GASTROINTESTINAL ENDOSCOPY          Social History  She reports that she has never smoked. She has never been exposed to tobacco smoke. She has never used smokeless tobacco. She reports current alcohol use. She reports that she does not use drugs.    Family History  Family History   Problem Relation Name Age of Onset    Testicular cancer Father      Other (mental disorder) Father      Hypertension Father      Other (small cell lung cancer) Maternal Grandmother      Breast cancer Maternal Grandmother      Other (Cardiac disorder) Maternal Grandfather       Other (Cardiac disorder) Paternal Grandmother      Other (Cardiac disorder) Paternal Grandfather      Diabetes Paternal Grandfather          Allergies  Amoxicillin, Azithromycin, Cefdinir, and Miconazole    Review of Systems   Constitutional: Negative.    HENT: Negative.     Eyes: Negative.    Respiratory: Negative.     Cardiovascular: Negative.    Gastrointestinal:  Positive for abdominal pain, blood in stool, diarrhea and nausea.   Endocrine: Negative.    Musculoskeletal: Negative.    Skin: Negative.    Allergic/Immunologic: Negative.    Neurological:  Positive for syncope.   Psychiatric/Behavioral: Negative.     All other systems reviewed and are negative.       Physical Exam  Constitutional:       Appearance: Normal appearance.   HENT:      Head: Normocephalic and atraumatic.      Nose: Nose normal.      Mouth/Throat:      Mouth: Mucous membranes are dry.   Eyes:      Extraocular Movements: Extraocular movements intact.      Conjunctiva/sclera: Conjunctivae normal.   Cardiovascular:      Rate and Rhythm: Normal rate and regular rhythm.      Pulses: Normal pulses.      Heart sounds: Normal heart sounds.   Pulmonary:      Effort: Pulmonary effort is normal.      Breath sounds: Normal breath sounds.   Abdominal:      General: Bowel sounds are normal.      Palpations: Abdomen is soft.      Tenderness: There is abdominal tenderness.   Musculoskeletal:         General: Normal range of motion.      Cervical back: Normal range of motion and neck supple.   Skin:     General: Skin is warm and dry.   Neurological:      General: No focal deficit present.      Mental Status: She is alert and oriented to person, place, and time. Mental status is at baseline.   Psychiatric:         Mood and Affect: Mood normal.          Last Recorded Vitals  Last menstrual period 01/22/2024.    Relevant Results        23 y.o. female with past medical history of PTSD, IBS, depression, generalized anxiety disorder, transferred from [facility]  secondary to bright red blood per rectum.     Assessment/Plan   Principal Problem:    GI bleeding      GI bleed  -H&H stable at this time.  Continue to monitor  -History of hemorrhoids colonoscopy in 2023 reports not available but per patient they found hemorrhoids at that time.  -GI has been consulted  -IV Protonix twice daily  -CT abdomen pelvis report not available from peripheral ER.  Per signout report the patient has colitis.  No leukocytosis, afebrile.  Will wait for further GI recommendations.    IBS  PTSD  Anxiety and depression  -Continue with home medications once verified    DVT prophylaxis: Hold in setting of GI bleeding.        I spent 50 minutes in the professional and overall care of this patient.      Asad Pelayo MD

## 2024-01-30 NOTE — H&P (VIEW-ONLY)
"Inpatient consult to gastroenterology  Consult performed by: Sergey Bailey MD  Consult ordered by: Asad Pelayo MD          Reason For Consult  \"GI bleeding\"      [facility] Gastroenterology Consultation Note    ASSESSMENT and PLAN:       Melanie Lance is a 23 y.o. female with a significant past medical history of PTSD, anxiety/depression, and IBS who presented with nausea/vomiting, abdominal pain, diarrhea, and rectal bleeding. GI was consulted for \"GI bleeding\".       Nausea/vomiting, diarrhea, abdominal pain, and rectal bleeding in setting of COVID19 infection  Suspect infectious cause of symptoms including possible COVID19. Other infectious cause possible as well. Also possible that she had dehydration or hypovolemia related to COVID which has led to ischemic colitis. Imaging findings are nonspecific and likely related to underdistension. Underlying IBD less likely. Previously EGD/colonoscopy was recommend and ultimately she will need those done, but no emergent indication at this time and Hgb remains normal. In setting of COVID would prefer to hold off on endoscopy, but will monitor clinical course. Will order labs to evaluate for infectious causes and will also check lactate which was not done at the OSH ER. Will check inflammatory markers (CRP and calprotectin), but would be difficult to interpret an elevated result in the setting of confirmed COVID.  - labs ordered  - IV fluids and treatment of COVID per IMS  - nursing should document BMs including number and color of BMs (descriptions of BMs as \"bloody\" are not helpful)          Sergey Bailey MD        Gastroenterology    [facility]    Clinical   [facility]        HISTORY OF PRESENT ILLNESS:     History Of Present Illness:    Melanie Lance is a 23 y.o. female with a significant past medical history of PTSD, anxiety/depression, and IBS who presented with " "nausea/vomiting, abdominal pain, diarrhea, and rectal bleeding. GI was consulted for \"GI bleeding\".      She says that starting yesterday she had nausea/vomiting, periumbilical pain, and diarrhea. She says that these symptoms are similar to previous GI symptoms, but seemed to start suddenly. Her pain is periumbilical and diffuse. She describes cramping and sharp pain. No hematemesis. She says that she isn't sure if there was blood in her stool initially, but after one of the subsequent BMs she noticed some red blood in her stool and then subsequent BMs have been mainly red blood. She says that bleeding in the past has been heavy at times, but has been mostly on the toilet paper or mixed with stool and this seemed different. She denies any ill contacts. She does wonder if maybe she was exposed to raw chicken/eggs recently through cooking. No recent antibiotics or medication changes.    At F labs showed no anemia (Hgb 13.3 g/dL), leukocytosis (16), unremarkable CMP, normal lipase, and positive COVID. Lactate was not checked. CT (without contrast) showed possible descending colon wall thickening, but limited by lack of contrast and decompression. Interestingly the admission H&P states that none of these results (labs, imaging, colonoscopy) are available. Previous CT with contrast in November was normal.    She was previously seen by GI (Dr. Eason) on 11/24/2023 at which time she complained of abdominal pain, nausea/vomiting, diarrhea, and rectal bleeding. At that time EGD/colonoscopy was recommended and scheduled, but not done because she had cancelled that appointment.      Endoscopy History:  12/27/2021 - Colonoscopy: normal TI, internal hemorrhoids otherwise normal colon      Review of systems:     Patient denies any heartburn/GERD, N/V, dysphagia, odynophagia, diarrhea, constipation, hematemesis, melena, or weight loss.    I performed a complete 10 point review of systems and it is negative except as noted in " "HPI or above. All other systems have been reviewed and are negative.        PAST HISTORIES:       Past Medical History:  She has a past medical history of Depression, Gestational hypertension (10/13/2023), and Personal history of other diseases of the respiratory system (01/02/2015).    Past Surgical History:  She has a past surgical history that includes Tympanostomy tube placement (Bilateral); Eye surgery; and Upper gastrointestinal endoscopy.      Social History:  She reports that she has never smoked. She has never been exposed to tobacco smoke. She has never used smokeless tobacco. She reports current alcohol use. She reports that she does not use drugs.    Family History:  No known GI disease, specifically denies pancreatitis, Crohn's, colon cancer, gastroesophageal cancer, or ulcerative colitis.    Family History   Problem Relation Name Age of Onset    Testicular cancer Father      Other (mental disorder) Father      Hypertension Father      Other (small cell lung cancer) Maternal Grandmother      Breast cancer Maternal Grandmother      Other (Cardiac disorder) Maternal Grandfather      Other (Cardiac disorder) Paternal Grandmother      Other (Cardiac disorder) Paternal Grandfather      Diabetes Paternal Grandfather          Allergies:  Amoxicillin, Azithromycin, Cefdinir, and Miconazole      OBJECTIVE:       Last Recorded Vitals:  Vitals:    01/30/24 1300 01/30/24 1310 01/30/24 1312   BP: 124/79 130/82 104/73   BP Location: Left arm Left arm Left arm   Patient Position: Lying Sitting Standing   Pulse: 102 (!) 119 (!) 124   Resp: 16 16 16   Temp: 37.2 °C (99 °F)     TempSrc: Temporal     SpO2:  95%    Weight: 68 kg (150 lb)     Height: 1.6 m (5' 2.99\")       /73 (BP Location: Left arm, Patient Position: Standing)   Pulse (!) 124   Temp 37.2 °C (99 °F) (Temporal)   Resp 16   Ht 1.6 m (5' 2.99\")   Wt 68 kg (150 lb)   LMP 01/22/2024   SpO2 95%   BMI 26.58 kg/m²      Physical Exam:    Physical " Exam  Vitals reviewed.   Constitutional:       General: She is not in acute distress.     Appearance: She is not ill-appearing.   HENT:      Head: Normocephalic and atraumatic.   Eyes:      General: No scleral icterus.  Cardiovascular:      Rate and Rhythm: Regular rhythm. Tachycardia present.      Pulses: Normal pulses.      Heart sounds: Normal heart sounds. No murmur heard.  Pulmonary:      Effort: Pulmonary effort is normal. No respiratory distress.      Breath sounds: Normal breath sounds. No wheezing.   Abdominal:      General: Bowel sounds are normal.      Palpations: Abdomen is soft.      Tenderness: There is abdominal tenderness (diffuse). There is no rebound.   Musculoskeletal:         General: No swelling or deformity.   Skin:     General: Skin is warm and dry.      Coloration: Skin is not jaundiced.      Findings: No rash.   Neurological:      General: No focal deficit present.      Mental Status: She is alert and oriented to person, place, and time.   Psychiatric:         Mood and Affect: Mood normal.         Behavior: Behavior normal.         Thought Content: Thought content normal.         Judgment: Judgment normal.           Inpatient Medications:  buPROPion XL, 300 mg, oral, Daily  FLUoxetine, 20 mg, oral, Daily  pantoprazole, 40 mg, intravenous, BID      PRN medications: dicyclomine, oxyCODONE    Outpatient Medications:  Prior to Admission medications    Medication Sig Start Date End Date Taking? Authorizing Provider   buPROPion XL (Wellbutrin XL) 300 mg 24 hr tablet Take 1 tablet (300 mg) by mouth once daily. 7/6/23   Historical Provider, MD   desogestreL-ethinyl estradioL (Apri) 0.15-0.03 mg tablet Take 1 tablet by mouth once daily. 11/30/23 11/29/24  Kamar Cooper MD   dicyclomine (Bentyl) 10 mg capsule Take 1 capsule (10 mg) by mouth 4 times a day as needed (abdominal pain). 11/24/23 11/23/24  Raymond Eason DO   famotidine (Pepcid) 40 mg tablet Take 1 tablet (40 mg) by mouth once  "daily at bedtime. 12/4/23 4/2/24  Clara Mayo MD   FLUoxetine (PROzac) 20 mg capsule Take 1 capsule (20 mg) by mouth once daily. 11/30/23 2/28/24  Kamar Cooper MD   DilmaLyte-ROSARIO 236-22.74-6.74 -5.86 gram solution  11/24/23   Historical Provider, MD       LABS AND IMAGING:     Labs:  Recent labs reviewed in the EMR.    No results found for: \"WBC\", \"HGB\", \"MCV\", \"PLT\", \"IRON\", \"TIBC\", \"IRONSAT\", \"FERRITIN\", \"NUIYXVIV26\", \"FOLATE\"    No results found for: \"NA\", \"K\", \"CL\", \"BUN\", \"CREATININE\"    No results found for: \"BILITOT\", \"BILIDIR\", \"ALKPHOS\", \"AST\", \"ALT\", \"LIPASE\"    No results found for: \"CRP\", \"CALPS\"      Imaging:  CT ABD/PEL WO IVCON    Result Date: 1/30/2024  * * *Final Report* * * DATE OF EXAM: Jan 30 2024  3:41AM   [facility]   0531  -  CT ABD/PEL WO IVCON  / ACCESSION #  [id number] PROCEDURE REASON: Nausea/vomiting      * * * * Physician Interpretation * * * *  EXAMINATION:  CT ABDOMEN AND PELVIS WITHOUT IV CONTRAST CLINICAL HISTORY:  Abdominal pain, nausea/vomiting, diarrhea TECHNIQUE: Non-IV contrast imaging of the abdomen and pelvis was performed using standard technique, scanning from just above the dome of the diaphragm to the symphysis pubis.  Unenhanced imaging is limited for the evaluation of some intra-abdominal and pelvic pathology. MQ:  CTAPWO_3 Contrast: IV: None : ml of CT Radiation dose: Integrated Dose-length product (DLP) for this visit =   410 mGy*cm. CT Dose Reduction Employed: Automated exposure control (AEC) COMPARISON: Abdominal CT 11/10/2023. RESULT: Solid abdominal organs: Unremarkable. Biliary: Unremarkable. GI tract and mesentery: No bowel obstruction.  Descending colon appears mildly thickened, although limited due to decompression. Normal appendix. Peritoneal cavity: No free air or free fluid. Retroperitoneum: Unremarkable. Vasculature: Unremarkable. Pelvis: Unremarkable. Bones/Soft Tissues: No acute abnormality. Lower thorax: Unremarkable.    IMPRESSION: Suspected mild " nonspecific left colitis. : BOSSMAN   Transcribe Date/Time: Jan 30 2024  3:41A Dictated by : DANIA AMBROSE MD This examination was interpreted and the report reviewed and electronically signed by: DANIA AMBROSE MD on Jan 30 2024  3:49AM  EST

## 2024-01-30 NOTE — CARE PLAN
The patient's goals for the shift include      The clinical goals for the shift include Pt to maintain a pain of less than 5

## 2024-01-31 LAB
ALBUMIN SERPL BCP-MCNC: 3.4 G/DL (ref 3.4–5)
ALP SERPL-CCNC: 53 U/L (ref 33–110)
ALT SERPL W P-5'-P-CCNC: 12 U/L (ref 7–45)
ANION GAP SERPL CALC-SCNC: 12 MMOL/L (ref 10–20)
AST SERPL W P-5'-P-CCNC: 13 U/L (ref 9–39)
BILIRUB SERPL-MCNC: 0.5 MG/DL (ref 0–1.2)
BUN SERPL-MCNC: 7 MG/DL (ref 6–23)
C COLI+JEJ+UPSA DNA STL QL NAA+PROBE: NOT DETECTED
CALCIUM SERPL-MCNC: 8.2 MG/DL (ref 8.6–10.3)
CHLORIDE SERPL-SCNC: 106 MMOL/L (ref 98–107)
CO2 SERPL-SCNC: 23 MMOL/L (ref 21–32)
CREAT SERPL-MCNC: 0.79 MG/DL (ref 0.5–1.05)
EC STX1 GENE STL QL NAA+PROBE: NOT DETECTED
EC STX2 GENE STL QL NAA+PROBE: NOT DETECTED
EGFRCR SERPLBLD CKD-EPI 2021: >90 ML/MIN/1.73M*2
ERYTHROCYTE [DISTWIDTH] IN BLOOD BY AUTOMATED COUNT: 12.8 % (ref 11.5–14.5)
GLUCOSE SERPL-MCNC: 94 MG/DL (ref 74–99)
HCT VFR BLD AUTO: 38.5 % (ref 36–46)
HGB BLD-MCNC: 12.7 G/DL (ref 12–16)
MCH RBC QN AUTO: 29.6 PG (ref 26–34)
MCHC RBC AUTO-ENTMCNC: 33 G/DL (ref 32–36)
MCV RBC AUTO: 90 FL (ref 80–100)
NOROVIRUS GI + GII RNA STL NAA+PROBE: NOT DETECTED
NRBC BLD-RTO: 0 /100 WBCS (ref 0–0)
PLATELET # BLD AUTO: 302 X10*3/UL (ref 150–450)
POTASSIUM SERPL-SCNC: 3.7 MMOL/L (ref 3.5–5.3)
PROT SERPL-MCNC: 6.3 G/DL (ref 6.4–8.2)
RBC # BLD AUTO: 4.29 X10*6/UL (ref 4–5.2)
RV RNA STL NAA+PROBE: NOT DETECTED
SALMONELLA DNA STL QL NAA+PROBE: NOT DETECTED
SHIGELLA DNA SPEC QL NAA+PROBE: NOT DETECTED
SODIUM SERPL-SCNC: 137 MMOL/L (ref 136–145)
V CHOLERAE DNA STL QL NAA+PROBE: NOT DETECTED
WBC # BLD AUTO: 21.2 X10*3/UL (ref 4.4–11.3)
Y ENTEROCOL DNA STL QL NAA+PROBE: NOT DETECTED

## 2024-01-31 PROCEDURE — 80053 COMPREHEN METABOLIC PANEL: CPT | Performed by: INTERNAL MEDICINE

## 2024-01-31 PROCEDURE — C9113 INJ PANTOPRAZOLE SODIUM, VIA: HCPCS | Performed by: INTERNAL MEDICINE

## 2024-01-31 PROCEDURE — 85027 COMPLETE CBC AUTOMATED: CPT | Performed by: INTERNAL MEDICINE

## 2024-01-31 PROCEDURE — 2500000001 HC RX 250 WO HCPCS SELF ADMINISTERED DRUGS (ALT 637 FOR MEDICARE OP): Performed by: INTERNAL MEDICINE

## 2024-01-31 PROCEDURE — 96375 TX/PRO/DX INJ NEW DRUG ADDON: CPT

## 2024-01-31 PROCEDURE — 87328 CRYPTOSPORIDIUM AG IA: CPT | Performed by: INTERNAL MEDICINE

## 2024-01-31 PROCEDURE — 99232 SBSQ HOSP IP/OBS MODERATE 35: CPT | Performed by: INTERNAL MEDICINE

## 2024-01-31 PROCEDURE — G0378 HOSPITAL OBSERVATION PER HR: HCPCS

## 2024-01-31 PROCEDURE — 96376 TX/PRO/DX INJ SAME DRUG ADON: CPT

## 2024-01-31 PROCEDURE — 36415 COLL VENOUS BLD VENIPUNCTURE: CPT | Performed by: INTERNAL MEDICINE

## 2024-01-31 PROCEDURE — 2500000004 HC RX 250 GENERAL PHARMACY W/ HCPCS (ALT 636 FOR OP/ED): Performed by: STUDENT IN AN ORGANIZED HEALTH CARE EDUCATION/TRAINING PROGRAM

## 2024-01-31 PROCEDURE — 2500000004 HC RX 250 GENERAL PHARMACY W/ HCPCS (ALT 636 FOR OP/ED): Performed by: INTERNAL MEDICINE

## 2024-01-31 PROCEDURE — 87329 GIARDIA AG IA: CPT | Performed by: INTERNAL MEDICINE

## 2024-01-31 PROCEDURE — 99232 SBSQ HOSP IP/OBS MODERATE 35: CPT | Performed by: NURSE PRACTITIONER

## 2024-01-31 RX ORDER — ONDANSETRON HYDROCHLORIDE 2 MG/ML
4 INJECTION, SOLUTION INTRAVENOUS EVERY 6 HOURS PRN
Status: DISCONTINUED | OUTPATIENT
Start: 2024-01-31 | End: 2024-02-02 | Stop reason: HOSPADM

## 2024-01-31 RX ORDER — POLYETHYLENE GLYCOL 3350, SODIUM CHLORIDE, SODIUM BICARBONATE, POTASSIUM CHLORIDE 420; 11.2; 5.72; 1.48 G/4L; G/4L; G/4L; G/4L
4000 POWDER, FOR SOLUTION ORAL ONCE
Status: COMPLETED | OUTPATIENT
Start: 2024-01-31 | End: 2024-01-31

## 2024-01-31 RX ADMIN — PANTOPRAZOLE SODIUM 40 MG: 40 INJECTION, POWDER, FOR SOLUTION INTRAVENOUS at 10:22

## 2024-01-31 RX ADMIN — BUPROPION HYDROCHLORIDE 300 MG: 150 TABLET, FILM COATED, EXTENDED RELEASE ORAL at 10:22

## 2024-01-31 RX ADMIN — FLUOXETINE HYDROCHLORIDE 20 MG: 20 CAPSULE ORAL at 10:22

## 2024-01-31 RX ADMIN — OXYCODONE HYDROCHLORIDE 5 MG: 5 TABLET ORAL at 19:33

## 2024-01-31 RX ADMIN — OXYCODONE HYDROCHLORIDE 5 MG: 5 TABLET ORAL at 06:33

## 2024-01-31 RX ADMIN — OXYCODONE HYDROCHLORIDE 5 MG: 5 TABLET ORAL at 14:19

## 2024-01-31 RX ADMIN — PANTOPRAZOLE SODIUM 40 MG: 40 INJECTION, POWDER, FOR SOLUTION INTRAVENOUS at 22:09

## 2024-01-31 RX ADMIN — SODIUM CHLORIDE 75 ML/HR: 9 INJECTION, SOLUTION INTRAVENOUS at 14:19

## 2024-01-31 RX ADMIN — OXYCODONE HYDROCHLORIDE 5 MG: 5 TABLET ORAL at 10:22

## 2024-01-31 RX ADMIN — POLYETHYLENE GLYCOL 3350, SODIUM SULFATE ANHYDROUS, SODIUM BICARBONATE, SODIUM CHLORIDE, POTASSIUM CHLORIDE 4000 ML: 236; 22.74; 6.74; 5.86; 2.97 POWDER, FOR SOLUTION ORAL at 16:02

## 2024-01-31 RX ADMIN — OXYCODONE HYDROCHLORIDE 5 MG: 5 TABLET ORAL at 02:08

## 2024-01-31 RX ADMIN — ONDANSETRON 4 MG: 2 INJECTION INTRAMUSCULAR; INTRAVENOUS at 22:09

## 2024-01-31 SDOH — ECONOMIC STABILITY: GENERAL

## 2024-01-31 SDOH — ECONOMIC STABILITY: TRANSPORTATION INSECURITY
IN THE PAST 12 MONTHS, HAS LACK OF TRANSPORTATION KEPT YOU FROM MEETINGS, WORK, OR FROM GETTING THINGS NEEDED FOR DAILY LIVING?: NO

## 2024-01-31 SDOH — ECONOMIC STABILITY: FOOD INSECURITY: WITHIN THE PAST 12 MONTHS, THE FOOD YOU BOUGHT JUST DIDN'T LAST AND YOU DIDN'T HAVE MONEY TO GET MORE.: SOMETIMES TRUE

## 2024-01-31 SDOH — ECONOMIC STABILITY: INCOME INSECURITY: IN THE LAST 12 MONTHS, WAS THERE A TIME WHEN YOU WERE NOT ABLE TO PAY THE MORTGAGE OR RENT ON TIME?: YES

## 2024-01-31 SDOH — ECONOMIC STABILITY: TRANSPORTATION INSECURITY

## 2024-01-31 SDOH — ECONOMIC STABILITY: HOUSING INSECURITY: IN THE PAST 12 MONTHS HAS THE ELECTRIC, GAS, OIL, OR WATER COMPANY THREATENED TO SHUT OFF SERVICES IN YOUR HOME?: NO

## 2024-01-31 SDOH — ECONOMIC STABILITY: TRANSPORTATION INSECURITY: IN THE PAST 12 MONTHS, HAS LACK OF TRANSPORTATION KEPT YOU FROM MEDICAL APPOINTMENTS OR FROM GETTING MEDICATIONS?: NO

## 2024-01-31 SDOH — ECONOMIC STABILITY: HOUSING INSECURITY: IN THE LAST 12 MONTHS, HOW MANY PLACES HAVE YOU LIVED?: 2

## 2024-01-31 SDOH — ECONOMIC STABILITY: HOUSING INSECURITY
IN THE LAST 12 MONTHS, WAS THERE A TIME WHEN YOU DID NOT HAVE A STEADY PLACE TO SLEEP OR SLEPT IN A SHELTER (INCLUDING NOW)?: NO

## 2024-01-31 SDOH — ECONOMIC STABILITY: FOOD INSECURITY

## 2024-01-31 SDOH — ECONOMIC STABILITY: FOOD INSECURITY: WITHIN THE PAST 12 MONTHS, YOU WORRIED THAT YOUR FOOD WOULD RUN OUT BEFORE YOU GOT MONEY TO BUY MORE.: OFTEN TRUE

## 2024-01-31 SDOH — ECONOMIC STABILITY: HOUSING INSECURITY: IN THE LAST 12 MONTHS, WAS THERE A TIME WHEN YOU WERE NOT ABLE TO PAY THE MORTGAGE OR RENT ON TIME?: YES

## 2024-01-31 SDOH — ECONOMIC STABILITY: TRANSPORTATION INSECURITY
IN THE PAST 12 MONTHS, HAS THE LACK OF TRANSPORTATION KEPT YOU FROM MEDICAL APPOINTMENTS OR FROM GETTING MEDICATIONS?: NO

## 2024-01-31 SDOH — ECONOMIC STABILITY: FOOD INSECURITY: WITHIN THE PAST 12 MONTHS, YOU WORRIED THAT YOUR FOOD WOULD RUN OUT BEFORE YOU GOT THE MONEY TO BUY MORE.: OFTEN TRUE

## 2024-01-31 SDOH — ECONOMIC STABILITY: FOOD INSECURITY: WITHIN THE PAST 12 MONTHS, THE FOOD YOU BOUGHT JUST DIDN’T LAST AND YOU DIDN’T HAVE MONEY TO GET MORE.: SOMETIMES TRUE

## 2024-01-31 SDOH — ECONOMIC STABILITY: HOUSING INSECURITY

## 2024-01-31 ASSESSMENT — PAIN - FUNCTIONAL ASSESSMENT: PAIN_FUNCTIONAL_ASSESSMENT: 0-10

## 2024-01-31 ASSESSMENT — PAIN SCALES - GENERAL
PAINLEVEL_OUTOF10: 0 - NO PAIN
PAINLEVEL_OUTOF10: 7
PAINLEVEL_OUTOF10: 7

## 2024-01-31 ASSESSMENT — ACTIVITIES OF DAILY LIVING (ADL): LACK_OF_TRANSPORTATION: NO

## 2024-01-31 ASSESSMENT — SOCIAL DETERMINANTS OF HEALTH (SDOH): IN THE PAST 12 MONTHS, HAS THE ELECTRIC, GAS, OIL, OR WATER COMPANY THREATENED TO SHUT OFF SERVICE IN YOUR HOME?: NO

## 2024-01-31 NOTE — PROGRESS NOTES
Melanie Lance is a 23 y.o. female admitted for GI bleeding. Pharmacy reviewed the patient's [id number] medications and allergies for accuracy.    The list below reflects the PTA list prior to pharmacy medication history. A summary a changes to the PTA medication list has been listed below. Please review each medication in order reconciliation for additional clarification and justification.    Medications added:  Prenate DHA every day     Medications modified:    Medications to be removed:  Gavilyte-G     Medications of concern:      Prior to Admission Medications   Prescriptions Last Dose Informant Patient Reported? Taking?   FLUoxetine (PROzac) 20 mg capsule   No    Sig: Take 1 capsule (20 mg) by mouth once daily.   GaviLyte-G 236-22.74-6.74 -5.86 gram solution   Yes    buPROPion XL (Wellbutrin XL) 300 mg 24 hr tablet   Yes    Sig: Take 1 tablet (300 mg) by mouth once daily.   desogestreL-ethinyl estradioL (Apri) 0.15-0.03 mg tablet   No    Sig: Take 1 tablet by mouth once daily.   dicyclomine (Bentyl) 10 mg capsule   No    Sig: Take 1 capsule (10 mg) by mouth 4 times a day as needed (abdominal pain).   famotidine (Pepcid) 40 mg tablet   No    Sig: Take 1 tablet (40 mg) by mouth once daily at bedtime.      Facility-Administered Medications: None       Ryann Guevara CPhT

## 2024-01-31 NOTE — CONSULTS
Consults    Primary MD: Kamar Cooper MD    Reason For Consult  Antibiotic management     History Of Present Illness  Melanie Lance is a 23 y.o. female with past medical history of PTSD, IBS, depression, generalized anxiety disorder, transferred from [facility] secondary to bright red blood per rectum.  Patient states she also has history of hemorrhoids in the past and has had colonoscopy a couple years ago.  She is postpartum 5 months ago.  He states around 7 PM last night she started having bloody bowel movements with diarrhea nausea and vomiting.  She also passed out during those episodes of vomiting.  She also reports abdominal pain generalized.  The patient also reports intermittent use of NSAIDs.  She states about once a week.  She states she has had on and off blood in her stools at times tarry stools.  The patient denies any fever or chills cough or chest pain at this time.  Signout report states there is a possibility she is positive for COVID.  Will test her for COVID as no records from [place] are available.  The patient is admitted for further care and management.      Past Medical History  She has a past medical history of Depression, Gestational hypertension (10/13/2023), and Personal history of other diseases of the respiratory system (01/02/2015).    Surgical History  She has a past surgical history that includes Tympanostomy tube placement (Bilateral); Eye surgery; and Upper gastrointestinal endoscopy.     Social History     Occupational History    Not on file   Tobacco Use    Smoking status: Never     Passive exposure: Never    Smokeless tobacco: Never   Vaping Use    Vaping Use: Every day    Substances: Nicotine   Substance and Sexual Activity    Alcohol use: Yes     Comment: occasional    Drug use: Never    Sexual activity: Yes     Partners: Male     Travel History   Travel since 12/31/23    No documented travel since 12/31/23       Family History  Family History   Problem Relation Name  Age of Onset    Testicular cancer Father      Other (mental disorder) Father      Hypertension Father      Other (small cell lung cancer) Maternal Grandmother      Breast cancer Maternal Grandmother      Other (Cardiac disorder) Maternal Grandfather      Other (Cardiac disorder) Paternal Grandmother      Other (Cardiac disorder) Paternal Grandfather      Diabetes Paternal Grandfather       Allergies  Amoxicillin, Azithromycin, Cefdinir, and Miconazole     Immunization History   Administered Date(s) Administered    DTaP, Unspecified 2001, 2001, 2001, 06/05/2002, 07/31/2006    Flu vaccine (IIV4), preservative free *Check age/dose* 11/30/2023    HPV, Quadrivalent 02/26/2013, 04/22/2013, 08/22/2013    Hepatitis A vaccine, pediatric/adolescent (HAVRIX, VAQTA) 02/26/2013, 04/02/2018    Hepatitis B vaccine, adult (RECOMBIVAX, ENGERIX) 03/12/2019    Hepatitis B vaccine, pediatric/adolescent (RECOMBIVAX, ENGERIX) 2001, 2001, 04/02/2018    Hib (HbOC) 2001, 2001, 2001, 06/05/2002    Influenza, injectable, quadrivalent 10/29/2015, 11/15/2019    Influenza, seasonal, injectable 12/13/2006    MMR vaccine, subcutaneous (MMR II) 03/13/2002, 07/31/2006, 12/31/2006    Meningococcal MCV4P 02/26/2013, 03/12/2019    Meningococcal MPSV4 04/02/2018    Novel influenza-H1N1-09, preservative-free 12/08/2009, 01/15/2010    Pneumococcal Conjugate PCV 7 2001, 2001, 2001, 09/04/2002    Poliovirus vaccine, subcutaneous (IPOL) 2001, 2001, 03/13/2002, 07/31/2006    Tdap vaccine, age 7 year and older (BOOSTRIX, ADACEL) 02/26/2013    Varicella vaccine, subcutaneous (VARIVAX) 03/13/2002, 12/13/2006, 12/31/2006     Medications  Home medications:  Medications Prior to Admission   Medication Sig Dispense Refill Last Dose    buPROPion XL (Wellbutrin XL) 300 mg 24 hr tablet Take 1 tablet (300 mg) by mouth once daily.       desogestreL-ethinyl estradioL (Apri) 0.15-0.03 mg tablet  Take 1 tablet by mouth once daily. 28 tablet 12     dicyclomine (Bentyl) 10 mg capsule Take 1 capsule (10 mg) by mouth 4 times a day as needed (abdominal pain). 60 capsule 11     famotidine (Pepcid) 40 mg tablet Take 1 tablet (40 mg) by mouth once daily at bedtime. 30 tablet 3     FLUoxetine (PROzac) 20 mg capsule Take 1 capsule (20 mg) by mouth once daily. 30 capsule 2     GaviLyte-G 236-22.74-6.74 -5.86 gram solution         Current medications:  Scheduled medications  buPROPion XL, 300 mg, oral, Daily  FLUoxetine, 20 mg, oral, Daily  pantoprazole, 40 mg, intravenous, BID  polyethylene glycol-electrolytes, 4,000 mL, oral, Once      Continuous medications  sodium chloride 0.9%, 75 mL/hr, Last Rate: 75 mL/hr (01/30/24 2301)      PRN medications  PRN medications: acetaminophen **OR** acetaminophen **OR** acetaminophen, dicyclomine, melatonin, oxyCODONE, oxyCODONE    Review of Systems   As above    Objective  Range of Vitals (last 24 hours)  Heart Rate:  []   Temp:  [36.6 °C (97.8 °F)-36.9 °C (98.4 °F)]   Resp:  [15-16]   BP: (111-131)/(69-87)   SpO2:  [97 %-99 %]   Daily Weight  01/30/24 : 68 kg (150 lb)    Body mass index is 26.58 kg/m².     Physical Exam     General: AAO*3  Skin: no rashes  Neck: supple  CVS: S1S2  Chest:CTAB  GI: soft, non tender  : no CVAT  Psych: alert,oriented  CNS: no FND    Relevant Results  Outside Hospital Results  No  Labs  Results from last 72 hours   Lab Units 01/31/24  0639   WBC AUTO x10*3/uL 21.2*   HEMOGLOBIN g/dL 12.7   HEMATOCRIT % 38.5   PLATELETS AUTO x10*3/uL 302     Results from last 72 hours   Lab Units 01/31/24  0639   SODIUM mmol/L 137   POTASSIUM mmol/L 3.7   CHLORIDE mmol/L 106   CO2 mmol/L 23   BUN mg/dL 7   CREATININE mg/dL 0.79   GLUCOSE mg/dL 94   CALCIUM mg/dL 8.2*   ANION GAP mmol/L 12   EGFR mL/min/1.73m*2 >90     Results from last 72 hours   Lab Units 01/31/24  0639   ALK PHOS U/L 53   BILIRUBIN TOTAL mg/dL 0.5   PROTEIN TOTAL g/dL 6.3*   ALT U/L 12   AST  "U/L 13   ALBUMIN g/dL 3.4     Estimated Creatinine Clearance: 103.3 mL/min (by C-G formula based on SCr of 0.79 mg/dL).  C-Reactive Protein   Date Value Ref Range Status   01/30/2024 2.63 (H) <1.00 mg/dL Final     HIV 1 and 2 Screen   Date Value Ref Range Status   02/21/2023 CANCELED       Comment:      HIV Ag/Ab screen is performed using the Siemens [employer]   HIV Ag/Ab Combo assay which detects the presence of HIV    p24 antigen as well as antibodies to HIV-1   (Group M and O) and HIV-2.    Result canceled by the ancillary.       No results found for: \"HEPCABINIT\", \"HEPCAB\", \"HCVPCRQUANT\"    Assessment/Plan     Leukocytosis   GI bleeding  PTSD  Anxiety   IBS      Suggest:   Check blood cx  GI PCR panel negative  CT reviewed. Mild non specific colitis  Leukocytosis may be driven by vomiting/GI bleed  Planned for colonscopy tomorrow  Monitor off antibiotics  Trend wbc and temps    If clinically worsens, will start meropenem given multiple allergies    Leyda Harris ID  Pager:[phone]  Date of service: 1/31/2024  Time of service: 1:23 PM    Trend wbc and temps  Monitor off covid therapies         Leyda Hartman MD  "

## 2024-01-31 NOTE — CARE PLAN
The patient's goals for the shift include  pain control    The clinical goals for the shift include pt will maintain pain rating less than 5 out of 10      Problem: Pain  Goal: Takes deep breaths with improved pain control throughout the shift  Outcome: Progressing  Goal: Turns in bed with improved pain control throughout the shift  Outcome: Progressing  Goal: Walks with improved pain control throughout the shift  Outcome: Progressing  Goal: Performs ADL's with improved pain control throughout shift  Outcome: Progressing  Goal: Participates in PT with improved pain control throughout the shift  Outcome: Progressing  Goal: Free from opioid side effects throughout the shift  Outcome: Progressing  Goal: Free from acute confusion related to pain meds throughout the shift  Outcome: Progressing

## 2024-01-31 NOTE — PROGRESS NOTES
Melanie Lance is a 23 y.o. female on day 1 of admission presenting with GI bleeding.      Subjective   The patient is seen and evaluated this morning.  The patient showed me bloody bowel movements that she is having.  She continues to complain of abdominal pain.  Denies any nausea or vomiting.  She is in isolation secondary to her COVID.       Objective     Last Recorded Vitals  /73 (BP Location: Left arm, Patient Position: Lying)   Pulse 88   Temp 36.7 °C (98.1 °F) (Temporal)   Resp 16   Wt 68 kg (150 lb)   SpO2 100%   Intake/Output last 3 Shifts:    Intake/Output Summary (Last 24 hours) at 1/31/2024 1549  Last data filed at 1/31/2024 1417  Gross per 24 hour   Intake 200 ml   Output --   Net 200 ml       Admission Weight  Weight: 68 kg (150 lb) (01/30/24 1300)    Daily Weight  01/30/24 : 68 kg (150 lb)    Image Results  US PELVIS TRANSABDOMINAL WITH TRANSVAGINAL  Narrative: Interpreted By:  Glenn Norwood,   STUDY:  US PELVIS TRANSABDOMINAL WITH TRANSVAGINAL  11/8/2023 10:31 pm      INDICATION:  22 y/o   F with  Signs/Symptoms:pelvic pain      COMPARISON:  None.      ACCESSION NUMBER(S):  [id number]      ORDERING CLINICIAN:  ZHOU DARBY      TECHNIQUE:  Routine ultrasound of the pelvis was performed with duplex Doppler  (color and spectral) evaluation.  Evaluation of the female pelvis was  performed by transabdominal technique .  Static images were obtained  for remote interpretation.      FINDINGS:  Uterus is heterogeneous. Echogenic material within the endometrium.  Query blood products. No hyperemia to suggest retained products. The  uterus measures 4.4 by 4.6 x 8.7 cm. Endometrium is 1.2 cm. Both  ovaries are normal in size echotexture and vascularity. No evidence  of torsion.      Impression: Heterogeneous endometrium measuring 1.2 cm. This is not hyperemic to  suggest retained products. I suspect some of this is blood products.  If clinical symptoms do not resolve, further evaluation is  advised.  No torsion.      MACRO:  None      Signed by: Glenn Norwood 11/8/2023 10:44 PM  Dictation workstation:   [id number]      Physical Exam  Constitutional:       Appearance: Normal appearance.   HENT:      Head: Normocephalic and atraumatic.      Nose: Nose normal.      Mouth/Throat:      Mouth: Mucous membranes are dry.   Eyes:      Extraocular Movements: Extraocular movements intact.      Conjunctiva/sclera: Conjunctivae normal.   Cardiovascular:      Rate and Rhythm: Normal rate and regular rhythm.      Pulses: Normal pulses.      Heart sounds: Normal heart sounds.   Pulmonary:      Effort: Pulmonary effort is normal.      Breath sounds: Normal breath sounds.   Abdominal:      General: Bowel sounds are normal.      Palpations: Abdomen is soft.      Tenderness: There is abdominal tenderness.   Musculoskeletal:         General: Normal range of motion.      Cervical back: Normal range of motion and neck supple.   Skin:     General: Skin is warm and dry.   Neurological:      General: No focal deficit present.      Mental Status: She is alert and oriented to person, place, and time. Mental status is at baseline.   Psychiatric:         Mood and Affect: Mood normal.         Behavior: Behavior normal.         Thought Content: Thought content normal.         Judgment: Judgment normal.         Relevant Results               Assessment/Plan          23 y.o. female with past medical history of PTSD, IBS, depression, generalized anxiety disorder, transferred from [facility] secondary to bright red blood per rectum.  CT scan showed nonspecific colitis.  She has been tested positive for COVID.        Principal Problem:    GI bleeding    Colitis  GI bleed  -Patient also tested positive for COVID.  Could be related to her COVID.  Appreciate infectious disease recommendations as well.  Patient does have leukocytosis.  Currently holding off on antibiotics.  -Stool studies came back negative.  Appreciate GI plans for  colonoscopy tomorrow  -H&H stable at this time.  Continue to monitor  -IV Protonix twice daily  -Calprotectin levels are pending    COVID-19 infection  -Currently on room air continue with isolation.  -Being retested as no results are available in chart.     IBS  PTSD  Anxiety and depression  -Continue with home medications once verified     DVT prophylaxis: Hold in setting of GI bleeding.               Asad Pelayo MD

## 2024-01-31 NOTE — PROGRESS NOTES
"Melanie Lance is a 23 y.o. female on day 1 of admission presenting with GI bleeding.    Subjective   Patient seen and examined this morning. Continues to have generalized abdominal pain. Having nausea, but denies any vomiting. Reports 5 watery BMs over night with bright red blood. She requests her colonoscopy while admitted.        Objective     Physical Exam  Constitutional:       Appearance: Normal appearance.   Eyes:      Conjunctiva/sclera: Conjunctivae normal.   Cardiovascular:      Rate and Rhythm: Normal rate and regular rhythm.      Heart sounds: Normal heart sounds. No murmur heard.  Pulmonary:      Breath sounds: Normal breath sounds.   Abdominal:      General: There is no distension.      Palpations: Abdomen is soft.      Tenderness: There is abdominal tenderness (generalized). There is no guarding.   Skin:     General: Skin is warm and dry.   Neurological:      Mental Status: She is alert and oriented to person, place, and time.   Psychiatric:         Mood and Affect: Mood normal.         Behavior: Behavior normal.         Last Recorded Vitals  Blood pressure 117/76, pulse 99, temperature 36.6 °C (97.8 °F), temperature source Temporal, resp. rate 16, height 1.6 m (5' 2.99\"), weight 68 kg (150 lb), last menstrual period 01/22/2024, SpO2 97 %.  Intake/Output last 3 Shifts:  I/O last 3 completed shifts:  In: 276.3 (4.1 mL/kg) [P.O.:120; I.V.:156.3 (2.3 mL/kg)]  Out: - (0 mL/kg)   Weight: 68 kg     Relevant Results      CT ABD/PEL WO IVCON    Result Date: 1/30/2024  * * *Final Report* * * DATE OF EXAM: Jan 30 2024  3:41AM   [facility]   0531  -  CT ABD/PEL WO IVCON  / ACCESSION #  [id number] PROCEDURE REASON: Nausea/vomiting      * * * * Physician Interpretation * * * *  EXAMINATION:  CT ABDOMEN AND PELVIS WITHOUT IV CONTRAST CLINICAL HISTORY:  Abdominal pain, nausea/vomiting, diarrhea TECHNIQUE: Non-IV contrast imaging of the abdomen and pelvis was performed using standard technique, scanning from just above the " "dome of the diaphragm to the symphysis pubis.  Unenhanced imaging is limited for the evaluation of some intra-abdominal and pelvic pathology. MQ:  CTAPWO_3 Contrast: IV: None : ml of CT Radiation dose: Integrated Dose-length product (DLP) for this visit =   410 mGy*cm. CT Dose Reduction Employed: Automated exposure control (AEC) COMPARISON: Abdominal CT 11/10/2023. RESULT: Solid abdominal organs: Unremarkable. Biliary: Unremarkable. GI tract and mesentery: No bowel obstruction.  Descending colon appears mildly thickened, although limited due to decompression. Normal appendix. Peritoneal cavity: No free air or free fluid. Retroperitoneum: Unremarkable. Vasculature: Unremarkable. Pelvis: Unremarkable. Bones/Soft Tissues: No acute abnormality. Lower thorax: Unremarkable.    IMPRESSION: Suspected mild nonspecific left colitis. : BOSSMAN   Transcribe Date/Time: Jan 30 2024  3:41A Dictated by : DANIA AMBROSE MD This examination was interpreted and the report reviewed and electronically signed by: DANIA AMBROSE MD on Jan 30 2024  3:49AM  EST           Assessment/Plan   Principal Problem:    GI bleeding    Melanie Lance is a 23 y.o. female with a significant past medical history of PTSD, anxiety/depression, and IBS who presented with nausea/vomiting, abdominal pain, diarrhea, and rectal bleeding. GI was consulted for \"GI bleeding\".        Nausea/vomiting, diarrhea, abdominal pain, and rectal bleeding in setting of COVID19 infection  Suspect infectious cause of symptoms including possible COVID19. Cdiff and stool pathogen PCR have been negative. O&P and calprotectin are pending. Also possible that she had dehydration or hypovolemia related to COVID which has led to mild ischemic colitis. Lactate not initially checked and now is normal. Imaging findings are nonspecific and may also be related to underdistension. Underlying IBD less likely. Previously EGD/colonoscopy was recommended and had planned to do these " "outpatient, however, with persistent lower GI bleeding will plan for colonoscopy while admitted. CRP was elevated which is likely related in part to COVID.  - calprotectin and O&P pending  - IV fluids and treatment of COVID per IMS  - antibiotics not currently indicated for GI symptoms  - nursing should document BMs including number and color of BMs (descriptions of BMs as \"bloody\" are not helpful)  -  clear liquid diet currently  - start bowel prep this evening (Golytely 4 L total)  - patient should drink 2 L of the Golytely (240 mL or 8 ounces every 15-20 minutes) starting at about 1700  - patient should drink the second 2 L of the Golytely (240 mL or 8 ounces every 15-20 minutes) starting at about midnight  - Golytely may be served chilled (do not pour over ice) or with Crystal Lite (clear, Lemonade flavor) to improve taste  - if patient is unable to tolerate the Golytely orally then NG tube placement should be considered, contact the primary service (IM) for orders if NG tube is needed  - monitor BMs, if the effluent is not clear by 0300 tomorrow then give an additional 1 L of Golytely which should be finished by 0400 (contact primary service for order for additional prep if needed)  - NPO after midnight except for prep  - completely NPO starting at 0500 tomorrow except for sips of water with needed medications  - colonoscopy tomorrow      Case discussed with Dr. Lynn Christensen, APRN-CNP      "

## 2024-01-31 NOTE — CARE PLAN
The patient's goals for the shift include      The clinical goals for the shift include pt will maintain pain rating less than 5 out of 10      Problem: Pain  Goal: Takes deep breaths with improved pain control throughout the shift  Outcome: Progressing  Goal: Turns in bed with improved pain control throughout the shift  Outcome: Progressing  Goal: Walks with improved pain control throughout the shift  Outcome: Progressing  Goal: Performs ADL's with improved pain control throughout shift  Outcome: Progressing  Goal: Participates in PT with improved pain control throughout the shift  Outcome: Progressing  Goal: Free from opioid side effects throughout the shift  Outcome: Progressing  Goal: Free from acute confusion related to pain meds throughout the shift  Outcome: Progressing

## 2024-01-31 NOTE — PROGRESS NOTES
01/31/24 1518   Discharge Planning   Living Arrangements Spouse/significant other   Support Systems Spouse/significant other   Assistance Needed none   Type of Residence Private residence   Number of Stairs to Enter Residence 4   Home or Post Acute Services None     Pt states she is independent at home; denies use of any DME; denies falls; is current with PCP appts.  She is covid +; not requiring O2.  Plan for colonoscopy tomorrow.  Patient denies home needs at this time but TCC explained that we will continue to follow for any discharge needs that may arise.

## 2024-02-01 ENCOUNTER — APPOINTMENT (OUTPATIENT)
Dept: OPERATING ROOM | Facility: HOSPITAL | Age: 24
End: 2024-02-01
Payer: COMMERCIAL

## 2024-02-01 ENCOUNTER — ANESTHESIA (OUTPATIENT)
Dept: OPERATING ROOM | Facility: HOSPITAL | Age: 24
End: 2024-02-01
Payer: COMMERCIAL

## 2024-02-01 ENCOUNTER — ANESTHESIA EVENT (OUTPATIENT)
Dept: OPERATING ROOM | Facility: HOSPITAL | Age: 24
End: 2024-02-01
Payer: COMMERCIAL

## 2024-02-01 ENCOUNTER — TELEPHONE (OUTPATIENT)
Dept: OBSTETRICS AND GYNECOLOGY | Facility: CLINIC | Age: 24
End: 2024-02-01

## 2024-02-01 LAB
ANION GAP SERPL CALC-SCNC: 10 MMOL/L (ref 10–20)
BASOPHILS # BLD AUTO: 0.02 X10*3/UL (ref 0–0.1)
BASOPHILS NFR BLD AUTO: 0.1 %
BUN SERPL-MCNC: 3 MG/DL (ref 6–23)
CALCIUM SERPL-MCNC: 8.1 MG/DL (ref 8.6–10.3)
CHLORIDE SERPL-SCNC: 106 MMOL/L (ref 98–107)
CO2 SERPL-SCNC: 26 MMOL/L (ref 21–32)
CREAT SERPL-MCNC: 0.68 MG/DL (ref 0.5–1.05)
EGFRCR SERPLBLD CKD-EPI 2021: >90 ML/MIN/1.73M*2
EOSINOPHIL # BLD AUTO: 0.01 X10*3/UL (ref 0–0.7)
EOSINOPHIL NFR BLD AUTO: 0.1 %
ERYTHROCYTE [DISTWIDTH] IN BLOOD BY AUTOMATED COUNT: 12.5 % (ref 11.5–14.5)
GLUCOSE SERPL-MCNC: 86 MG/DL (ref 74–99)
HCT VFR BLD AUTO: 36.2 % (ref 36–46)
HGB BLD-MCNC: 12.1 G/DL (ref 12–16)
IMM GRANULOCYTES # BLD AUTO: 0.06 X10*3/UL (ref 0–0.7)
IMM GRANULOCYTES NFR BLD AUTO: 0.4 % (ref 0–0.9)
LYMPHOCYTES # BLD AUTO: 2.51 X10*3/UL (ref 1.2–4.8)
LYMPHOCYTES NFR BLD AUTO: 16.4 %
MCH RBC QN AUTO: 29.6 PG (ref 26–34)
MCHC RBC AUTO-ENTMCNC: 33.4 G/DL (ref 32–36)
MCV RBC AUTO: 89 FL (ref 80–100)
MONOCYTES # BLD AUTO: 0.86 X10*3/UL (ref 0.1–1)
MONOCYTES NFR BLD AUTO: 5.6 %
NEUTROPHILS # BLD AUTO: 11.84 X10*3/UL (ref 1.2–7.7)
NEUTROPHILS NFR BLD AUTO: 77.4 %
NRBC BLD-RTO: 0 /100 WBCS (ref 0–0)
PLATELET # BLD AUTO: 270 X10*3/UL (ref 150–450)
POTASSIUM SERPL-SCNC: 3.2 MMOL/L (ref 3.5–5.3)
PREGNANCY TEST URINE, POC: NEGATIVE
RBC # BLD AUTO: 4.09 X10*6/UL (ref 4–5.2)
SODIUM SERPL-SCNC: 139 MMOL/L (ref 136–145)
WBC # BLD AUTO: 15.3 X10*3/UL (ref 4.4–11.3)

## 2024-02-01 PROCEDURE — 99232 SBSQ HOSP IP/OBS MODERATE 35: CPT | Performed by: INTERNAL MEDICINE

## 2024-02-01 PROCEDURE — 2500000004 HC RX 250 GENERAL PHARMACY W/ HCPCS (ALT 636 FOR OP/ED): Performed by: INTERNAL MEDICINE

## 2024-02-01 PROCEDURE — 3700000001 HC GENERAL ANESTHESIA TIME - INITIAL BASE CHARGE: Performed by: NURSE ANESTHETIST, CERTIFIED REGISTERED

## 2024-02-01 PROCEDURE — 3600000002 HC OR TIME - INITIAL BASE CHARGE - PROCEDURE LEVEL TWO: Performed by: NURSE ANESTHETIST, CERTIFIED REGISTERED

## 2024-02-01 PROCEDURE — 88305 TISSUE EXAM BY PATHOLOGIST: CPT | Performed by: PATHOLOGY

## 2024-02-01 PROCEDURE — 2500000004 HC RX 250 GENERAL PHARMACY W/ HCPCS (ALT 636 FOR OP/ED): Performed by: ANESTHESIOLOGY

## 2024-02-01 PROCEDURE — 2500000004 HC RX 250 GENERAL PHARMACY W/ HCPCS (ALT 636 FOR OP/ED): Performed by: NURSE ANESTHETIST, CERTIFIED REGISTERED

## 2024-02-01 PROCEDURE — 0753T DGTZ GLS MCRSCP SLD LEVEL IV: CPT | Mod: TC,SUR,PORLAB | Performed by: INTERNAL MEDICINE

## 2024-02-01 PROCEDURE — 3600000007 HC OR TIME - EACH INCREMENTAL 1 MINUTE - PROCEDURE LEVEL TWO: Performed by: NURSE ANESTHETIST, CERTIFIED REGISTERED

## 2024-02-01 PROCEDURE — 96375 TX/PRO/DX INJ NEW DRUG ADDON: CPT

## 2024-02-01 PROCEDURE — 81025 URINE PREGNANCY TEST: CPT | Performed by: ANESTHESIOLOGY

## 2024-02-01 PROCEDURE — 7100000001 HC RECOVERY ROOM TIME - INITIAL BASE CHARGE: Performed by: NURSE ANESTHETIST, CERTIFIED REGISTERED

## 2024-02-01 PROCEDURE — 3700000002 HC GENERAL ANESTHESIA TIME - EACH INCREMENTAL 1 MINUTE: Performed by: NURSE ANESTHETIST, CERTIFIED REGISTERED

## 2024-02-01 PROCEDURE — 2500000001 HC RX 250 WO HCPCS SELF ADMINISTERED DRUGS (ALT 637 FOR MEDICARE OP): Performed by: STUDENT IN AN ORGANIZED HEALTH CARE EDUCATION/TRAINING PROGRAM

## 2024-02-01 PROCEDURE — 2500000001 HC RX 250 WO HCPCS SELF ADMINISTERED DRUGS (ALT 637 FOR MEDICARE OP): Performed by: INTERNAL MEDICINE

## 2024-02-01 PROCEDURE — 85025 COMPLETE CBC W/AUTO DIFF WBC: CPT | Performed by: INTERNAL MEDICINE

## 2024-02-01 PROCEDURE — C9113 INJ PANTOPRAZOLE SODIUM, VIA: HCPCS | Performed by: INTERNAL MEDICINE

## 2024-02-01 PROCEDURE — 96376 TX/PRO/DX INJ SAME DRUG ADON: CPT

## 2024-02-01 PROCEDURE — 36415 COLL VENOUS BLD VENIPUNCTURE: CPT | Performed by: INTERNAL MEDICINE

## 2024-02-01 PROCEDURE — 80048 BASIC METABOLIC PNL TOTAL CA: CPT | Performed by: INTERNAL MEDICINE

## 2024-02-01 PROCEDURE — 7100000002 HC RECOVERY ROOM TIME - EACH INCREMENTAL 1 MINUTE: Performed by: NURSE ANESTHETIST, CERTIFIED REGISTERED

## 2024-02-01 PROCEDURE — G0378 HOSPITAL OBSERVATION PER HR: HCPCS

## 2024-02-01 PROCEDURE — 45380 COLONOSCOPY AND BIOPSY: CPT | Performed by: INTERNAL MEDICINE

## 2024-02-01 RX ORDER — LIDOCAINE HYDROCHLORIDE 10 MG/ML
0.1 INJECTION INFILTRATION; PERINEURAL ONCE
Status: CANCELLED | OUTPATIENT
Start: 2024-02-01 | End: 2024-02-01

## 2024-02-01 RX ORDER — PROPOFOL 10 MG/ML
INJECTION, EMULSION INTRAVENOUS AS NEEDED
Status: DISCONTINUED | OUTPATIENT
Start: 2024-02-01 | End: 2024-02-01

## 2024-02-01 RX ORDER — FAMOTIDINE 10 MG/ML
20 INJECTION INTRAVENOUS ONCE
Status: COMPLETED | OUTPATIENT
Start: 2024-02-01 | End: 2024-02-01

## 2024-02-01 RX ORDER — SODIUM CHLORIDE, SODIUM LACTATE, POTASSIUM CHLORIDE, CALCIUM CHLORIDE 600; 310; 30; 20 MG/100ML; MG/100ML; MG/100ML; MG/100ML
100 INJECTION, SOLUTION INTRAVENOUS CONTINUOUS
Status: CANCELLED | OUTPATIENT
Start: 2024-02-01

## 2024-02-01 RX ORDER — OXYCODONE HYDROCHLORIDE 5 MG/1
5 TABLET ORAL EVERY 4 HOURS PRN
Status: CANCELLED | OUTPATIENT
Start: 2024-02-01

## 2024-02-01 RX ORDER — SODIUM CHLORIDE, SODIUM LACTATE, POTASSIUM CHLORIDE, CALCIUM CHLORIDE 600; 310; 30; 20 MG/100ML; MG/100ML; MG/100ML; MG/100ML
100 INJECTION, SOLUTION INTRAVENOUS CONTINUOUS
Status: DISCONTINUED | OUTPATIENT
Start: 2024-02-01 | End: 2024-02-02 | Stop reason: HOSPADM

## 2024-02-01 RX ORDER — METRONIDAZOLE 500 MG/1
500 TABLET ORAL EVERY 8 HOURS SCHEDULED
Status: DISCONTINUED | OUTPATIENT
Start: 2024-02-01 | End: 2024-02-02 | Stop reason: HOSPADM

## 2024-02-01 RX ORDER — POTASSIUM CHLORIDE 20 MEQ/1
40 TABLET, EXTENDED RELEASE ORAL ONCE
Status: COMPLETED | OUTPATIENT
Start: 2024-02-01 | End: 2024-02-01

## 2024-02-01 RX ORDER — CEFDINIR 300 MG/1
300 CAPSULE ORAL 2 TIMES DAILY
Status: DISCONTINUED | OUTPATIENT
Start: 2024-02-01 | End: 2024-02-02 | Stop reason: HOSPADM

## 2024-02-01 RX ORDER — ONDANSETRON HYDROCHLORIDE 2 MG/ML
4 INJECTION, SOLUTION INTRAVENOUS ONCE AS NEEDED
Status: CANCELLED | OUTPATIENT
Start: 2024-02-01

## 2024-02-01 RX ORDER — MORPHINE SULFATE 2 MG/ML
1 INJECTION, SOLUTION INTRAMUSCULAR; INTRAVENOUS EVERY 5 MIN PRN
Status: CANCELLED | OUTPATIENT
Start: 2024-02-01

## 2024-02-01 RX ADMIN — FAMOTIDINE 20 MG: 10 INJECTION, SOLUTION INTRAVENOUS at 11:00

## 2024-02-01 RX ADMIN — OXYCODONE HYDROCHLORIDE 5 MG: 5 TABLET ORAL at 18:38

## 2024-02-01 RX ADMIN — POTASSIUM CHLORIDE 40 MEQ: 1500 TABLET, EXTENDED RELEASE ORAL at 15:19

## 2024-02-01 RX ADMIN — OXYCODONE HYDROCHLORIDE 5 MG: 5 TABLET ORAL at 00:50

## 2024-02-01 RX ADMIN — DICYCLOMINE HYDROCHLORIDE 10 MG: 10 CAPSULE ORAL at 20:38

## 2024-02-01 RX ADMIN — PANTOPRAZOLE SODIUM 40 MG: 40 INJECTION, POWDER, FOR SOLUTION INTRAVENOUS at 09:11

## 2024-02-01 RX ADMIN — METRONIDAZOLE 500 MG: 500 TABLET ORAL at 22:32

## 2024-02-01 RX ADMIN — PANTOPRAZOLE SODIUM 40 MG: 40 INJECTION, POWDER, FOR SOLUTION INTRAVENOUS at 20:38

## 2024-02-01 RX ADMIN — OXYCODONE HYDROCHLORIDE 5 MG: 5 TABLET ORAL at 14:00

## 2024-02-01 RX ADMIN — BUPROPION HYDROCHLORIDE 300 MG: 150 TABLET, FILM COATED, EXTENDED RELEASE ORAL at 09:11

## 2024-02-01 RX ADMIN — DICYCLOMINE HYDROCHLORIDE 10 MG: 10 CAPSULE ORAL at 09:22

## 2024-02-01 RX ADMIN — METRONIDAZOLE 500 MG: 500 TABLET ORAL at 15:19

## 2024-02-01 RX ADMIN — FLUOXETINE HYDROCHLORIDE 20 MG: 20 CAPSULE ORAL at 09:11

## 2024-02-01 RX ADMIN — CEFDINIR 300 MG: 300 CAPSULE ORAL at 20:38

## 2024-02-01 RX ADMIN — PROPOFOL 200 MG: 10 INJECTION, EMULSION INTRAVENOUS at 12:05

## 2024-02-01 SDOH — HEALTH STABILITY: MENTAL HEALTH: CURRENT SMOKER: 0

## 2024-02-01 ASSESSMENT — COGNITIVE AND FUNCTIONAL STATUS - GENERAL
MOBILITY SCORE: 24
MOBILITY SCORE: 24
DAILY ACTIVITIY SCORE: 24
DAILY ACTIVITIY SCORE: 24

## 2024-02-01 ASSESSMENT — PAIN - FUNCTIONAL ASSESSMENT
PAIN_FUNCTIONAL_ASSESSMENT: 0-10

## 2024-02-01 ASSESSMENT — PAIN SCALES - GENERAL
PAINLEVEL_OUTOF10: 2
PAINLEVEL_OUTOF10: 0 - NO PAIN
PAINLEVEL_OUTOF10: 0 - NO PAIN
PAINLEVEL_OUTOF10: 7
PAINLEVEL_OUTOF10: 0 - NO PAIN
PAINLEVEL_OUTOF10: 0 - NO PAIN
PAIN_LEVEL: 0
PAINLEVEL_OUTOF10: 0 - NO PAIN
PAINLEVEL_OUTOF10: 3
PAINLEVEL_OUTOF10: 4
PAINLEVEL_OUTOF10: 0 - NO PAIN

## 2024-02-01 NOTE — CARE PLAN
The patient's goals for the shift include      The clinical goals for the shift include Pt will maintain pain rating less than 5/10    Over the shift, the patient did not make progress toward the following goals. Barriers to progression include none. Recommendations to address these barriers include none.

## 2024-02-01 NOTE — POST-PROCEDURE NOTE
Colonoscopy completed. Full report in Epic.      Colonoscopy revealed segmental mucosal changes with erythema, edema, and ulceration in the distal transverse, splenic flexure, descending colon, and proximal sigmoid colon. The remainder of the examined colon was normal. The findings are consistent with colitis and suspect ischemic colitis given the distribution of findings. Biopsies were obtained.      Recommendations:  - advance diet as tolerated  - pathology pending  - recommend antibiotics be started to cover colonic microbiota, defer to ID for antibiotic selection given her allergies and concomitant COVID        Sergey Bailey MD    Gastroenterology    [facility]    Clinical   [facility]

## 2024-02-01 NOTE — PROGRESS NOTES
Social work consult placed for positive medical risk screen. SW reviewed pt's chart and communicated with TCC. No SW needs foreseen at this time. SW signing off; available upon request.    Raymond Fish, MSW, LSW ([id number])   Care Transitions

## 2024-02-01 NOTE — TELEPHONE ENCOUNTER
Patient called stating she is currently in the hospital and will not make her appt tomorrow with TB. She wanted to discuss her irregular periods at that visit. She did not take her pills since 1/28/24 due to not being able to keep anything down. She does not know when to start them again. Please advise. Thank you

## 2024-02-01 NOTE — ANESTHESIA PREPROCEDURE EVALUATION
Patient: Melanie Lance    Procedure Information       Date/Time: 02/01/24 1215    Scheduled providers: Sergey Bailey MD    Procedure: COLONOSCOPY    Location: [facility] OR            Relevant Problems   Anesthesia (within normal limits)      Cardiovascular (within normal limits)      Endocrine (within normal limits)      GI   (+) Chronic GERD   (+) GI bleeding      /Renal (within normal limits)      Neuro/Psych   (+) Generalized anxiety disorder   (+) Moderate episode of recurrent major depressive disorder (CMS/HCC)   (+) PTSD (post-traumatic stress disorder)      Pulmonary (within normal limits)      GI/Hepatic (within normal limits)      Hematology (within normal limits)      Musculoskeletal (within normal limits)      Eyes, Ears, Nose, and Throat (within normal limits)      Infectious Disease (within normal limits)       Clinical information reviewed:   Tobacco  Allergies  Meds   Med Hx  Surg Hx   Fam Hx  Soc Hx        NPO Detail:  No data recorded     Physical Exam    Airway  Mallampati: II  TM distance: >3 FB     Cardiovascular - normal exam     Dental - normal exam     Pulmonary - normal exam     Abdominal - normal exam           Anesthesia Plan    History of general anesthesia?: yes  History of complications of general anesthesia?: no    ASA 2     MAC     The patient is not a current smoker.    intravenous induction   Postoperative administration of opioids is intended.  Anesthetic plan and risks discussed with patient.  Use of blood products discussed with patient who.    Plan discussed with CRNA.

## 2024-02-01 NOTE — PROGRESS NOTES
Melanie Lance is a 23 y.o. female on day 1 of admission presenting with GI bleeding.    Subjective   Interval History: No new complaints. S/p colonoscopy     Review of Systems  As per HPI     Objective   Range of Vitals (last 24 hours)  Heart Rate:  [66-98]   Temp:  [36.3 °C (97.4 °F)-37.2 °C (98.9 °F)]   Resp:  [17-28]   BP: (100-117)/(60-75)   SpO2:  [95 %-100 %]   Daily Weight  01/30/24 : 68 kg (150 lb)    Body mass index is 26.58 kg/m².    Physical Exam  General: AAO*3  Skin: no rashes  Neck: supple  CVS: S1S2  Chest:CTAB  GI: soft, non tender  : no CVAT  Psych: alert,oriented  CNS: no FND      Antibiotics  buPROPion XL (Wellbutrin XL) 24 hr tablet 300 mg  FLUoxetine (PROzac) capsule 20 mg  famotidine (Pepcid) tablet 40 mg  dicyclomine (Bentyl) capsule 10 mg  enoxaparin (Lovenox) syringe 40 mg  sennosides (Senokot) tablet 17.2 mg  pantoprazole (ProtoNix) injection 40 mg  HYDROmorphone PF (Dilaudid) injection 0.2 mg  sodium chloride 0.9% infusion  oxyCODONE (Roxicodone) immediate release tablet 5 mg  acetaminophen (Tylenol) tablet 650 mg  acetaminophen (Tylenol) oral liquid 650 mg  acetaminophen (Tylenol) suppository 650 mg  melatonin tablet 6 mg  oxyCODONE (Roxicodone) immediate release tablet 5 mg  polyethylene glycol-electrolytes (Nulytely) solution 4,000 mL    ondansetron (Zofran) injection 4 mg  promethazine (Phenergan) in 0.9 % sodium chloride 50 mL IV 12.5 mg  propofol (Diprivan) injection  - Omnicell Override Pull  lactated Ringer's infusion  famotidine PF (Pepcid) injection 20 mg  lidocaine (Xylocaine) 10 mg/mL (1 %) injection 1 mg  lactated Ringer's infusion  oxyCODONE (Roxicodone) immediate release tablet 5 mg  morphine injection 1 mg  ondansetron (Zofran) injection 4 mg  famotidine PF (Pepcid) injection  - Omnicell Override Pull  potassium chloride CR (Klor-Con M20) ER tablet 40 mEq      Relevant Results  Labs  Results from last 72 hours   Lab Units 02/01/24  0503 01/31/24  0639   WBC AUTO x10*3/uL  15.3* 21.2*   HEMOGLOBIN g/dL 12.1 12.7   HEMATOCRIT % 36.2 38.5   PLATELETS AUTO x10*3/uL 270 302   NEUTROS PCT AUTO % 77.4  --    LYMPHS PCT AUTO % 16.4  --    MONOS PCT AUTO % 5.6  --    EOS PCT AUTO % 0.1  --      Results from last 72 hours   Lab Units 02/01/24  0503 01/31/24  0639   SODIUM mmol/L 139 137   POTASSIUM mmol/L 3.2* 3.7   CHLORIDE mmol/L 106 106   CO2 mmol/L 26 23   BUN mg/dL 3* 7   CREATININE mg/dL 0.68 0.79   GLUCOSE mg/dL 86 94   CALCIUM mg/dL 8.1* 8.2*   ANION GAP mmol/L 10 12   EGFR mL/min/1.73m*2 >90 >90     Results from last 72 hours   Lab Units 01/31/24  0639   ALK PHOS U/L 53   BILIRUBIN TOTAL mg/dL 0.5   PROTEIN TOTAL g/dL 6.3*   ALT U/L 12   AST U/L 13   ALBUMIN g/dL 3.4     Estimated Creatinine Clearance: 120 mL/min (by C-G formula based on SCr of 0.68 mg/dL).  C-Reactive Protein   Date Value Ref Range Status   01/30/2024 2.63 (H) <1.00 mg/dL Final     Assessment/Plan   Leukocytosis   GI bleeding  PTSD  Anxiety   IBS        Suggest:   Colonoscopy reviewed  GI PCR panel negative  CT reviewed. Mild non specific colitis  Leukocytosis may be driven by vomiting/GI bleed  Monitor off antibiotics  Trend wbc and temps     Reports she got diarrhea to cefdinir when she was a baby.   Dicussed with her that diarrhea is not a true allergy   Recommend cefdinir 300 q12 and flagyl 500 q12 for 7 days       Leyda Hartman MD

## 2024-02-01 NOTE — ANESTHESIA POSTPROCEDURE EVALUATION
Patient: Melanie Lance    Procedure Summary       Date: 02/01/24 Room / Location: [facility] OR    Anesthesia Start: 1203 Anesthesia Stop: 1229    Procedure: COLONOSCOPY Diagnosis: Gastrointestinal hemorrhage, unspecified gastrointestinal hemorrhage type    Scheduled Providers: Sergey Bailey MD; Naun Mayers RN Responsible Provider: HAYDER Ramos    Anesthesia Type: MAC ASA Status: 2            Anesthesia Type: MAC    Vitals Value Taken Time   /72 02/01/24 1251   Temp 37.2 °C (98.9 °F) 02/01/24 1250   Pulse 76 02/01/24 1258   Resp 13 02/01/24 1300   SpO2 100 % 02/01/24 1300   Vitals shown include unvalidated device data.    Anesthesia Post Evaluation    Patient location during evaluation: PACU  Patient participation: complete - patient participated  Level of consciousness: awake  Pain score: 0  Pain management: adequate  Airway patency: patent  Cardiovascular status: acceptable  Respiratory status: acceptable  Hydration status: acceptable  Postoperative Nausea and Vomiting: none        No notable events documented.

## 2024-02-01 NOTE — PROGRESS NOTES
Melanie Lance is a 23 y.o. female on day 1 of admission presenting with GI bleeding.      Subjective   The patient is seen and evaluated this morning.  The patient states she no longer has any bloody bowel movements today.  She continues to have abdominal pain.  She denies any nausea or vomiting.  She denies any shortness of breath remains on room air.  Plans for colonoscopy later today.       Objective     Last Recorded Vitals  /72   Pulse 70   Temp 37.2 °C (98.9 °F)   Resp 20   Wt 68 kg (150 lb)   SpO2 98%   Intake/Output last 3 Shifts:    Intake/Output Summary (Last 24 hours) at 2/1/2024 1429  Last data filed at 2/1/2024 1300  Gross per 24 hour   Intake 500 ml   Output --   Net 500 ml         Admission Weight  Weight: 68 kg (150 lb) (01/30/24 1300)    Daily Weight  01/30/24 : 68 kg (150 lb)    Image Results  Colonoscopy Diagnostic  Table formatting from the original result was not included.  Impression  Edematous, erythematous and ulcerated mucosa in the distal transverse   colon, splenic flexure, descending colon and proximal sigmoid colon,   concerning for ischemic colitis; performed cold forceps biopsy  Otherwise, the entire examined colon appeared normal.    Findings  Segmental edematous, erythematous and ulcerated mucosa in the distal   transverse colon, splenic flexure, descending colon and proximal sigmoid   colon, concerning for ischemic colitis; performed cold forceps biopsy.   Verification of patient identification for the specimen was done by the   physician, nurse and technician using the patient's name, birth date and   medical record number.;  Otherwise, the entire colon appeared normal.;       Recommendation   Await pathology results    Return to hospital [facility] for ongoing care.       Indication  Gastrointestinal hemorrhage, unspecified gastrointestinal hemorrhage type    Melanie Lance is a 23 y.o. female with a history of PTSD,   anxiety/depression, and IBS who presented with  nausea/vomiting, abdominal   pain, diarrhea, and rectal bleeding.    Hgb has remained in the normal range. CT (without contrast) showed   possible descending colon wall thickening, but limited by lack of contrast   and decompression. Previous CT with contrast in November was normal.     She was previously seen by GI (Dr. Eason) on 11/24/2023 at which time   she complained of abdominal pain, nausea/vomiting, diarrhea, and rectal   bleeding. At that time EGD/colonoscopy was recommended.        Endoscopy History:  12/27/2021 - Colonoscopy: normal TI, internal hemorrhoids otherwise normal   colon  There is no family history of colorectal cancer.    Medications  See Anesthesia Record.     Preprocedure  A history and physical has been performed, and patient medication   allergies have been reviewed. The patient's tolerance of previous   anesthesia has been reviewed. The risks and benefits of the procedure and   the sedation options and risks were discussed with the patient. All   questions were answered and informed consent obtained.    Details of the Procedure  The patient underwent monitored anesthesia care, which was administered by   an anesthesia professional. The patient's blood pressure, ECG, ETCO2,   heart rate, level of consciousness, oxygen and respirations were monitored   throughout the procedure. A digital rectal exam was performed. The scope   was introduced through the anus and advanced to the transverse colon.   Further scope advancement was not attempted due to the presence of colitis   and concern for ischemia. The quality of bowel preparation was evaluated   using the [place] Bowel Preparation Scale with scores of: transverse colon   = 2, left colon = 2. Bowel prep was adequate. The patient's estimated   blood loss was minimal (<5 mL). The procedure was not difficult. The   patient tolerated the procedure well. There were no apparent adverse   events.    Prior to the procedure, a History and Physical  was performed, and patient   medications and allergies were reviewed. Immediately prior to the   administration of medications, the patient was re-assessed for adequacy to   receive sedatives. The heart rate, respiratory rate, oxygen saturations,   blood pressure, adequacy of pulmonary ventilation, and response to care   were monitored throughout the procedure. The physical status of the   patient was re-assessed after the procedure.     The risks and benefits of the procedure and the sedation options and risks   were discussed with the patient and/or family including the risk of injury   to internal organs (including perforation) and the risk of missed lesions.   All questions were answered and informed consent was obtained.     Patient identification and proposed procedure were verified by the   physician, the nurse, the anesthetist and the technician in the   pre-procedure area and in the procedure room. After this verification the   variable stiffness pediatric colonoscope was passed under direct vision.   Anatomic landmarks were photographed including the rectum.    No sedation was administered by endoscopist. Sedation was administered by   the anesthesia staff. Refer to anesthesia documentation/charting for   details regarding medications administered and doses given.     Events  Procedure Events   Event Event Time   ENDO SCOPE IN TIME 2/1/2024 12:09 PM   ENDO SCOPE OUT TIME 2/1/2024 12:18 PM     Specimens  ID Type Source Tests Collected by Time   1 : Colon Erythema Tissue COLON  - RANDOM BIOPSY SURGICAL PATHOLOGY EXAM   Sergey Bailey MD 2/1/2024 1212     Procedure Location  [address] [address] [phone]    Referring Provider  Generic Provider [place]  No address on file    Procedure Provider    Sergey Bailey MD      Physical Exam  Constitutional:       Appearance: Normal appearance.   HENT:      Head: Normocephalic and  atraumatic.      Nose: Nose normal.      Mouth/Throat:      Mouth: Mucous membranes are dry.   Eyes:      Extraocular Movements: Extraocular movements intact.      Conjunctiva/sclera: Conjunctivae normal.   Cardiovascular:      Rate and Rhythm: Normal rate and regular rhythm.      Pulses: Normal pulses.      Heart sounds: Normal heart sounds.   Pulmonary:      Effort: Pulmonary effort is normal.      Breath sounds: Normal breath sounds.   Abdominal:      General: Bowel sounds are normal.      Palpations: Abdomen is soft.      Tenderness: There is abdominal tenderness.   Musculoskeletal:         General: Normal range of motion.      Cervical back: Normal range of motion and neck supple.   Skin:     General: Skin is warm and dry.   Neurological:      General: No focal deficit present.      Mental Status: She is alert and oriented to person, place, and time. Mental status is at baseline.   Psychiatric:         Mood and Affect: Mood normal.         Behavior: Behavior normal.         Thought Content: Thought content normal.         Judgment: Judgment normal.         Relevant Results               Assessment/Plan          23 y.o. female with past medical history of PTSD, IBS, depression, generalized anxiety disorder, transferred from [facility] secondary to bright red blood per rectum.  CT scan showed nonspecific colitis.  She has been tested positive for COVID.  Colonoscopy was done that showed finding to discerning for ischemic colitis.        Principal Problem:    GI bleeding    Colitis  GI bleed  -The patient underwent colonoscopy on 2/1/2024.  That showed segmental mucosal changes with erythema, edema, and ulceration in the distal transverse, splenic flexure, descending colon, and proximal sigmoid colon. The remainder of the examined colon was normal. The findings are consistent with colitis and suspect ischemic colitis given the distribution of findings. Biopsies were obtained.   -Stool studies came back  negative.  Appreciate GI   -H&H stable at this time.  Continue to monitor  -IV Protonix twice daily  -ID on board defer antibiotics to ID given multiple antibiotics allergies   -Leukocytosis secondary to colitis    COVID-19 infection  -Currently on room air continue with isolation.    Hypokalemia  -Replace as indicated.     IBS  PTSD  Anxiety and depression  -Continue with home medications      DVT prophylaxis: Hold in setting of GI bleeding.               Asad Pelayo MD

## 2024-02-02 ENCOUNTER — PHARMACY VISIT (OUTPATIENT)
Dept: PHARMACY | Facility: CLINIC | Age: 24
End: 2024-02-02
Payer: COMMERCIAL

## 2024-02-02 ENCOUNTER — APPOINTMENT (OUTPATIENT)
Dept: OBSTETRICS AND GYNECOLOGY | Facility: CLINIC | Age: 24
End: 2024-02-02
Payer: COMMERCIAL

## 2024-02-02 VITALS
BODY MASS INDEX: 26.58 KG/M2 | HEART RATE: 87 BPM | HEIGHT: 63 IN | SYSTOLIC BLOOD PRESSURE: 102 MMHG | OXYGEN SATURATION: 97 % | TEMPERATURE: 97.6 F | WEIGHT: 150 LBS | RESPIRATION RATE: 16 BRPM | DIASTOLIC BLOOD PRESSURE: 65 MMHG

## 2024-02-02 LAB
ANION GAP SERPL CALC-SCNC: 10 MMOL/L (ref 10–20)
BASOPHILS # BLD AUTO: 0.02 X10*3/UL (ref 0–0.1)
BASOPHILS NFR BLD AUTO: 0.2 %
BUN SERPL-MCNC: 4 MG/DL (ref 6–23)
CALCIUM SERPL-MCNC: 8 MG/DL (ref 8.6–10.3)
CALPROTECTIN STL-MCNT: 2940 UG/G
CHLORIDE SERPL-SCNC: 108 MMOL/L (ref 98–107)
CO2 SERPL-SCNC: 25 MMOL/L (ref 21–32)
CREAT SERPL-MCNC: 0.77 MG/DL (ref 0.5–1.05)
CRYPTOSP AG STL QL IA: NEGATIVE
EGFRCR SERPLBLD CKD-EPI 2021: >90 ML/MIN/1.73M*2
EOSINOPHIL # BLD AUTO: 0.04 X10*3/UL (ref 0–0.7)
EOSINOPHIL NFR BLD AUTO: 0.4 %
ERYTHROCYTE [DISTWIDTH] IN BLOOD BY AUTOMATED COUNT: 12.7 % (ref 11.5–14.5)
G LAMBLIA AG STL QL IA: NEGATIVE
GLUCOSE SERPL-MCNC: 86 MG/DL (ref 74–99)
HCT VFR BLD AUTO: 34.6 % (ref 36–46)
HGB BLD-MCNC: 11.6 G/DL (ref 12–16)
IMM GRANULOCYTES # BLD AUTO: 0.03 X10*3/UL (ref 0–0.7)
IMM GRANULOCYTES NFR BLD AUTO: 0.3 % (ref 0–0.9)
LYMPHOCYTES # BLD AUTO: 2.66 X10*3/UL (ref 1.2–4.8)
LYMPHOCYTES NFR BLD AUTO: 26.1 %
MCH RBC QN AUTO: 29.9 PG (ref 26–34)
MCHC RBC AUTO-ENTMCNC: 33.5 G/DL (ref 32–36)
MCV RBC AUTO: 89 FL (ref 80–100)
MONOCYTES # BLD AUTO: 0.46 X10*3/UL (ref 0.1–1)
MONOCYTES NFR BLD AUTO: 4.5 %
NEUTROPHILS # BLD AUTO: 6.97 X10*3/UL (ref 1.2–7.7)
NEUTROPHILS NFR BLD AUTO: 68.5 %
NRBC BLD-RTO: 0 /100 WBCS (ref 0–0)
PLATELET # BLD AUTO: 263 X10*3/UL (ref 150–450)
POTASSIUM SERPL-SCNC: 3.5 MMOL/L (ref 3.5–5.3)
RBC # BLD AUTO: 3.88 X10*6/UL (ref 4–5.2)
SODIUM SERPL-SCNC: 139 MMOL/L (ref 136–145)
WBC # BLD AUTO: 10.2 X10*3/UL (ref 4.4–11.3)

## 2024-02-02 PROCEDURE — 2500000001 HC RX 250 WO HCPCS SELF ADMINISTERED DRUGS (ALT 637 FOR MEDICARE OP): Performed by: STUDENT IN AN ORGANIZED HEALTH CARE EDUCATION/TRAINING PROGRAM

## 2024-02-02 PROCEDURE — 85025 COMPLETE CBC W/AUTO DIFF WBC: CPT | Performed by: INTERNAL MEDICINE

## 2024-02-02 PROCEDURE — 99238 HOSP IP/OBS DSCHRG MGMT 30/<: CPT | Performed by: INTERNAL MEDICINE

## 2024-02-02 PROCEDURE — 80048 BASIC METABOLIC PNL TOTAL CA: CPT | Performed by: INTERNAL MEDICINE

## 2024-02-02 PROCEDURE — C9113 INJ PANTOPRAZOLE SODIUM, VIA: HCPCS | Performed by: INTERNAL MEDICINE

## 2024-02-02 PROCEDURE — 2500000001 HC RX 250 WO HCPCS SELF ADMINISTERED DRUGS (ALT 637 FOR MEDICARE OP): Performed by: INTERNAL MEDICINE

## 2024-02-02 PROCEDURE — 96376 TX/PRO/DX INJ SAME DRUG ADON: CPT

## 2024-02-02 PROCEDURE — RXMED WILLOW AMBULATORY MEDICATION CHARGE

## 2024-02-02 PROCEDURE — 2500000004 HC RX 250 GENERAL PHARMACY W/ HCPCS (ALT 636 FOR OP/ED): Performed by: INTERNAL MEDICINE

## 2024-02-02 PROCEDURE — 36415 COLL VENOUS BLD VENIPUNCTURE: CPT | Performed by: INTERNAL MEDICINE

## 2024-02-02 PROCEDURE — 99232 SBSQ HOSP IP/OBS MODERATE 35: CPT | Performed by: NURSE PRACTITIONER

## 2024-02-02 PROCEDURE — G0378 HOSPITAL OBSERVATION PER HR: HCPCS

## 2024-02-02 RX ORDER — CEFDINIR 300 MG/1
300 CAPSULE ORAL 2 TIMES DAILY
Qty: 14 CAPSULE | Refills: 0 | Status: SHIPPED | OUTPATIENT
Start: 2024-02-02 | End: 2024-02-09

## 2024-02-02 RX ORDER — METRONIDAZOLE 500 MG/1
500 TABLET ORAL EVERY 8 HOURS SCHEDULED
Qty: 21 TABLET | Refills: 0 | Status: SHIPPED | OUTPATIENT
Start: 2024-02-02 | End: 2024-02-02 | Stop reason: SDUPTHER

## 2024-02-02 RX ORDER — CEFDINIR 300 MG/1
300 CAPSULE ORAL 2 TIMES DAILY
Qty: 14 CAPSULE | Refills: 0 | Status: SHIPPED | OUTPATIENT
Start: 2024-02-02 | End: 2024-02-02 | Stop reason: SDUPTHER

## 2024-02-02 RX ORDER — ONDANSETRON 4 MG/1
4 TABLET, ORALLY DISINTEGRATING ORAL EVERY 8 HOURS PRN
Qty: 10 TABLET | Refills: 0 | Status: SHIPPED | OUTPATIENT
Start: 2024-02-02 | End: 2024-02-02 | Stop reason: SDUPTHER

## 2024-02-02 RX ORDER — METRONIDAZOLE 500 MG/1
500 TABLET ORAL EVERY 8 HOURS SCHEDULED
Qty: 21 TABLET | Refills: 0 | Status: SHIPPED | OUTPATIENT
Start: 2024-02-02 | End: 2024-02-09

## 2024-02-02 RX ORDER — ONDANSETRON 4 MG/1
4 TABLET, ORALLY DISINTEGRATING ORAL EVERY 8 HOURS PRN
Qty: 10 TABLET | Refills: 0 | Status: SHIPPED | OUTPATIENT
Start: 2024-02-02 | End: 2024-02-06

## 2024-02-02 RX ADMIN — CEFDINIR 300 MG: 300 CAPSULE ORAL at 09:50

## 2024-02-02 RX ADMIN — FLUOXETINE HYDROCHLORIDE 20 MG: 20 CAPSULE ORAL at 09:50

## 2024-02-02 RX ADMIN — BUPROPION HYDROCHLORIDE 300 MG: 150 TABLET, FILM COATED, EXTENDED RELEASE ORAL at 09:50

## 2024-02-02 RX ADMIN — SODIUM CHLORIDE 75 ML/HR: 9 INJECTION, SOLUTION INTRAVENOUS at 04:16

## 2024-02-02 RX ADMIN — METRONIDAZOLE 500 MG: 500 TABLET ORAL at 06:03

## 2024-02-02 RX ADMIN — PANTOPRAZOLE SODIUM 40 MG: 40 INJECTION, POWDER, FOR SOLUTION INTRAVENOUS at 09:50

## 2024-02-02 ASSESSMENT — COGNITIVE AND FUNCTIONAL STATUS - GENERAL
DAILY ACTIVITIY SCORE: 24
MOBILITY SCORE: 24

## 2024-02-02 ASSESSMENT — PAIN SCALES - GENERAL: PAINLEVEL_OUTOF10: 0 - NO PAIN

## 2024-02-02 ASSESSMENT — PAIN - FUNCTIONAL ASSESSMENT: PAIN_FUNCTIONAL_ASSESSMENT: 0-10

## 2024-02-02 NOTE — PROGRESS NOTES
"Melanie Lance is a 23 y.o. female on day 1 of admission presenting with GI bleeding.    Subjective   Patient seen and examined this AM. She is no longer having rectal bleeding. She did have 2 loose BMs overnight. She still has some LLQ and LUQ abdominal pain. No nausea or vomiting. No melena.        Objective     Physical Exam  Constitutional:       General: She is not in acute distress.     Appearance: Normal appearance.   HENT:      Mouth/Throat:      Mouth: Mucous membranes are moist.      Comments: pink  Eyes:      Conjunctiva/sclera: Conjunctivae normal.      Pupils: Pupils are equal, round, and reactive to light.   Cardiovascular:      Rate and Rhythm: Normal rate and regular rhythm.      Heart sounds: No murmur heard.  Pulmonary:      Effort: Pulmonary effort is normal.      Breath sounds: Normal breath sounds.   Abdominal:      General: Bowel sounds are normal. There is no distension.      Palpations: Abdomen is soft.      Tenderness: There is abdominal tenderness (LLQ and LUQ). There is no guarding.   Skin:     General: Skin is warm and dry.      Coloration: Skin is not jaundiced.   Neurological:      Mental Status: She is alert and oriented to person, place, and time.   Psychiatric:         Mood and Affect: Mood normal.         Behavior: Behavior normal.         Last Recorded Vitals  Blood pressure 110/69, pulse 82, temperature 36.4 °C (97.5 °F), temperature source Temporal, resp. rate 16, height 1.6 m (5' 2.99\"), weight 68 kg (150 lb), last menstrual period 01/22/2024, SpO2 96 %.  Intake/Output last 3 Shifts:  I/O last 3 completed shifts:  In: 4228.5 (62.1 mL/kg) [P.O.:591; I.V.:3637.5 (53.5 mL/kg)]  Out: - (0 mL/kg)   Weight: 68 kg       Relevant Results      Colonoscopy Diagnostic    2/1/2024  Impression Edematous, erythematous and ulcerated mucosa in the distal transverse colon, splenic flexure, descending colon and proximal sigmoid colon, concerning for ischemic colitis; performed cold forceps biopsy " Otherwise, the entire examined colon appeared normal.  Findings Segmental edematous, erythematous and ulcerated mucosa in the distal transverse colon, splenic flexure, descending colon and proximal sigmoid colon, concerning for ischemic colitis; performed cold forceps biopsy. Verification of patient identification for the specimen was done by the physician, nurse and technician using the patient's name, birth date and medical record number.; Otherwise, the entire colon appeared normal.;          CT ABD/PEL WO IVCON    Result Date: 1/30/2024  * * *Final Report* * * DATE OF EXAM: Jan 30 2024  3:41AM   [facility]   0531  -  CT ABD/PEL WO IVCON  / ACCESSION #  [id number] PROCEDURE REASON: Nausea/vomiting      * * * * Physician Interpretation * * * *  EXAMINATION:  CT ABDOMEN AND PELVIS WITHOUT IV CONTRAST CLINICAL HISTORY:  Abdominal pain, nausea/vomiting, diarrhea TECHNIQUE: Non-IV contrast imaging of the abdomen and pelvis was performed using standard technique, scanning from just above the dome of the diaphragm to the symphysis pubis.  Unenhanced imaging is limited for the evaluation of some intra-abdominal and pelvic pathology. MQ:  CTAPWO_3 Contrast: IV: None : ml of CT Radiation dose: Integrated Dose-length product (DLP) for this visit =   410 mGy*cm. CT Dose Reduction Employed: Automated exposure control (AEC) COMPARISON: Abdominal CT 11/10/2023. RESULT: Solid abdominal organs: Unremarkable. Biliary: Unremarkable. GI tract and mesentery: No bowel obstruction.  Descending colon appears mildly thickened, although limited due to decompression. Normal appendix. Peritoneal cavity: No free air or free fluid. Retroperitoneum: Unremarkable. Vasculature: Unremarkable. Pelvis: Unremarkable. Bones/Soft Tissues: No acute abnormality. Lower thorax: Unremarkable.    IMPRESSION: Suspected mild nonspecific left colitis. : BOSSMAN   Transcribe Date/Time: Jan 30 2024  3:41A Dictated by : DANIA AMBROSE MD This examination was  "interpreted and the report reviewed and electronically signed by: DANIA AMBROSE MD on Jan 30 2024  3:49AM  EST             Assessment/Plan   Principal Problem:    GI bleeding    Melanie Lance is a 23 y.o. female with a significant past medical history of PTSD, anxiety/depression, and IBS who presented with nausea/vomiting, abdominal pain, diarrhea, and rectal bleeding. GI was consulted for \"GI bleeding\".        Colitis   Recurrent symptoms of nausea/vomiting and diarrhea with rectal bleeding. She is also covid+. Cdiff and stool pathogen panel were negative. It is possible that COVID19 is contributing to symptoms and COVID may also have led to dehydration/hypovolemia and ischemic colitis. Colonoscopy on 2/1/2024 showed segmental mucosal changes with erythema, edema, and ulceration in the distal transverse, splenic flexure, descending colon, and proximal sigmoid colon that was concerning for ischemic colitis given the distribution of findings. Biopsies were obtained to evaluate for other etiologies. Clinically improving today on antibiotics  - IV fluids and treatment of COVID per IMS  - pathology pending   - complete 14 day course of antibiotics (cefdinir and flagyl)  - Follow up in GI clinic with Dr. Eason, our office will arrange        There is no inpatient GI coverage overnight (after 1600 today) and this weekend. Dr. Lujan will provide coverage again starting on 2/5/2024, but if there is a need for further GI evaluation or procedure prior to that then the patient should be transferred.        Case discussed with Dr. Bailey.          Niki Christensen, APRN-CNP      "

## 2024-02-02 NOTE — CARE PLAN
Problem: Pain  Goal: Takes deep breaths with improved pain control throughout the shift  Outcome: Progressing  Goal: Turns in bed with improved pain control throughout the shift  Outcome: Progressing  Goal: Walks with improved pain control throughout the shift  Outcome: Progressing  Goal: Performs ADL's with improved pain control throughout shift  Outcome: Progressing  Goal: Participates in PT with improved pain control throughout the shift  Outcome: Progressing  Goal: Free from opioid side effects throughout the shift  Outcome: Progressing  Goal: Free from acute confusion related to pain meds throughout the shift  Outcome: Progressing     Problem: Pain - Adult  Goal: Verbalizes/displays adequate comfort level or baseline comfort level  Outcome: Progressing     Problem: Safety - Adult  Goal: Free from fall injury  Outcome: Progressing     Problem: Discharge Planning  Goal: Discharge to home or other facility with appropriate resources  Outcome: Progressing     Problem: Chronic Conditions and Co-morbidities  Goal: Patient's chronic conditions and co-morbidity symptoms are monitored and maintained or improved  Outcome: Progressing   The patient's goals for the shift include      The clinical goals for the shift include p will maintain adequate pain control this shift    Over the shift, the patient did not make progress toward the following goals. Barriers to progression include /. Recommendations to address these barriers include /.

## 2024-02-02 NOTE — NURSING NOTE
Patient discharging to home. Patient requesting scripts to go to  pharmacy. IV removed. Patient educated on medications with return understanding.

## 2024-02-02 NOTE — PROGRESS NOTES
Melanie Lance is a 23 y.o. female on day 1 of admission presenting with GI bleeding.    Subjective   Interval History: No new complaints. S/p colonoscopy     Review of Systems  As per HPI     Objective   Range of Vitals (last 24 hours)  Heart Rate:  [79-87]   Temp:  [36.4 °C (97.5 °F)-36.4 °C (97.6 °F)]   Resp:  [16]   BP: (102-110)/(65-69)   SpO2:  [96 %-97 %]   Daily Weight  01/30/24 : 68 kg (150 lb)    Body mass index is 26.58 kg/m².    Physical Exam  General: AAO*3  Skin: no rashes  Neck: supple  CVS: S1S2  Chest:CTAB  GI: soft, non tender  : no CVAT  Psych: alert,oriented  CNS: no FND      Antibiotics  buPROPion XL (Wellbutrin XL) 24 hr tablet 300 mg  FLUoxetine (PROzac) capsule 20 mg  famotidine (Pepcid) tablet 40 mg  dicyclomine (Bentyl) capsule 10 mg  enoxaparin (Lovenox) syringe 40 mg  sennosides (Senokot) tablet 17.2 mg  pantoprazole (ProtoNix) injection 40 mg  HYDROmorphone PF (Dilaudid) injection 0.2 mg  sodium chloride 0.9% infusion  oxyCODONE (Roxicodone) immediate release tablet 5 mg  acetaminophen (Tylenol) tablet 650 mg  acetaminophen (Tylenol) oral liquid 650 mg  acetaminophen (Tylenol) suppository 650 mg  melatonin tablet 6 mg  oxyCODONE (Roxicodone) immediate release tablet 5 mg  polyethylene glycol-electrolytes (Nulytely) solution 4,000 mL    ondansetron (Zofran) injection 4 mg  promethazine (Phenergan) in 0.9 % sodium chloride 50 mL IV 12.5 mg  propofol (Diprivan) injection  - Omnicell Override Pull  lactated Ringer's infusion  famotidine PF (Pepcid) injection 20 mg  lidocaine (Xylocaine) 10 mg/mL (1 %) injection 1 mg  lactated Ringer's infusion  oxyCODONE (Roxicodone) immediate release tablet 5 mg  morphine injection 1 mg  ondansetron (Zofran) injection 4 mg  famotidine PF (Pepcid) injection  - Omnicell Override Pull  potassium chloride CR (Klor-Con M20) ER tablet 40 mEq      Relevant Results  Labs  Results from last 72 hours   Lab Units 02/02/24  0409 02/01/24  0503 01/31/24  0639   WBC AUTO  x10*3/uL 10.2 15.3* 21.2*   HEMOGLOBIN g/dL 11.6* 12.1 12.7   HEMATOCRIT % 34.6* 36.2 38.5   PLATELETS AUTO x10*3/uL 263 270 302   NEUTROS PCT AUTO % 68.5 77.4  --    LYMPHS PCT AUTO % 26.1 16.4  --    MONOS PCT AUTO % 4.5 5.6  --    EOS PCT AUTO % 0.4 0.1  --        Results from last 72 hours   Lab Units 02/02/24  0409 02/01/24  0503 01/31/24  0639   SODIUM mmol/L 139 139 137   POTASSIUM mmol/L 3.5 3.2* 3.7   CHLORIDE mmol/L 108* 106 106   CO2 mmol/L 25 26 23   BUN mg/dL 4* 3* 7   CREATININE mg/dL 0.77 0.68 0.79   GLUCOSE mg/dL 86 86 94   CALCIUM mg/dL 8.0* 8.1* 8.2*   ANION GAP mmol/L 10 10 12   EGFR mL/min/1.73m*2 >90 >90 >90       Results from last 72 hours   Lab Units 01/31/24  0639   ALK PHOS U/L 53   BILIRUBIN TOTAL mg/dL 0.5   PROTEIN TOTAL g/dL 6.3*   ALT U/L 12   AST U/L 13   ALBUMIN g/dL 3.4       Estimated Creatinine Clearance: 106 mL/min (by C-G formula based on SCr of 0.77 mg/dL).  C-Reactive Protein   Date Value Ref Range Status   01/30/2024 2.63 (H) <1.00 mg/dL Final     Assessment/Plan   Leukocytosis   GI bleeding  PTSD  Anxiety   IBS        Suggest:   Colonoscopy reviewed  GI PCR panel negative  CT reviewed. Mild non specific colitis  Leukocytosis may be driven by vomiting/GI bleed  Monitor off antibiotics  Trend wbc and temps     Discharge planning:   Cefdinir 300 q12 and flagyl 500 q12 for 7 days       Leyda Hartman MD

## 2024-02-02 NOTE — CARE PLAN
The patient's goals for the shift include      The clinical goals for the shift include p will maintain adequate pain control this shift

## 2024-02-04 LAB — O+P STL MICRO: NEGATIVE

## 2024-02-05 ENCOUNTER — DOCUMENTATION (OUTPATIENT)
Dept: PRIMARY CARE | Facility: CLINIC | Age: 24
End: 2024-02-05
Payer: COMMERCIAL

## 2024-02-05 ENCOUNTER — PATIENT OUTREACH (OUTPATIENT)
Dept: PRIMARY CARE | Facility: CLINIC | Age: 24
End: 2024-02-05
Payer: COMMERCIAL

## 2024-02-05 NOTE — PROGRESS NOTES
Discharge Facility: [facility]   Discharge Diagnosis: Gastrointestinal hemorrhage   Admission Date: 2/2/24  Discharge Date: 2/2/24    PCP Appointment Date: 2/6/24  Specialist Appointment Date:  Hospital Encounter and Summary: Linked           Umair Mendoza LPN

## 2024-02-06 ENCOUNTER — OFFICE VISIT (OUTPATIENT)
Dept: PRIMARY CARE | Facility: CLINIC | Age: 24
End: 2024-02-06
Payer: COMMERCIAL

## 2024-02-06 VITALS
BODY MASS INDEX: 26.61 KG/M2 | TEMPERATURE: 97.4 F | HEART RATE: 101 BPM | HEIGHT: 63 IN | OXYGEN SATURATION: 97 % | DIASTOLIC BLOOD PRESSURE: 74 MMHG | SYSTOLIC BLOOD PRESSURE: 111 MMHG | WEIGHT: 150.2 LBS | RESPIRATION RATE: 16 BRPM

## 2024-02-06 DIAGNOSIS — F33.1 MODERATE EPISODE OF RECURRENT MAJOR DEPRESSIVE DISORDER (MULTI): ICD-10-CM

## 2024-02-06 DIAGNOSIS — D50.0 BLOOD LOSS ANEMIA: ICD-10-CM

## 2024-02-06 DIAGNOSIS — F41.1 GENERALIZED ANXIETY DISORDER: ICD-10-CM

## 2024-02-06 DIAGNOSIS — K92.2 LOWER GI BLEED: ICD-10-CM

## 2024-02-06 DIAGNOSIS — K52.9 COLITIS: Primary | ICD-10-CM

## 2024-02-06 LAB
LABORATORY COMMENT REPORT: NORMAL
PATH REPORT.COMMENTS IMP SPEC: NORMAL
PATH REPORT.FINAL DX SPEC: NORMAL
PATH REPORT.GROSS SPEC: NORMAL
PATH REPORT.RELEVANT HX SPEC: NORMAL
PATH REPORT.TOTAL CANCER: NORMAL

## 2024-02-06 PROCEDURE — 99495 TRANSJ CARE MGMT MOD F2F 14D: CPT | Performed by: FAMILY MEDICINE

## 2024-02-06 PROCEDURE — 1036F TOBACCO NON-USER: CPT | Performed by: FAMILY MEDICINE

## 2024-02-06 RX ORDER — VALACYCLOVIR HYDROCHLORIDE 500 MG/1
TABLET, FILM COATED ORAL
COMMUNITY
Start: 2023-08-07

## 2024-02-06 RX ORDER — FLUOXETINE HYDROCHLORIDE 20 MG/1
20 CAPSULE ORAL DAILY
Qty: 30 CAPSULE | Refills: 5 | Status: SHIPPED | OUTPATIENT
Start: 2024-02-06 | End: 2024-08-04

## 2024-02-06 ASSESSMENT — ENCOUNTER SYMPTOMS
PALPITATIONS: 0
CONFUSION: 0
SHORTNESS OF BREATH: 0
ARTHRALGIAS: 0
CHILLS: 0
ABDOMINAL PAIN: 0
CHEST TIGHTNESS: 0
FEVER: 0

## 2024-02-07 ENCOUNTER — PATIENT OUTREACH (OUTPATIENT)
Dept: PRIMARY CARE | Facility: CLINIC | Age: 24
End: 2024-02-07
Payer: COMMERCIAL

## 2024-02-07 NOTE — PROGRESS NOTES
Call regarding appt. with PCP on (2/6/24) after hospitalization.  At time of outreach call the patient feels as if their condition has (improved) since last visit.  Reviewed the PCP appointment with the pt and addressed any questions or concerns.     Umair Mendoza LPN

## 2024-02-07 NOTE — PROGRESS NOTES
"Subjective   Patient ID: Melanie Lance is a 23 y.o. female who presents for Follow-up (Hospital discharge on 2/1/2024 and follow up).    HPI   Patient today for posthospital follow-up.  She was hospitalized with abdominal discomfort and bloody stools.  During her hospital admission she ended up having a colonoscopy performed which showed inflammatory changes.  Biopsies were taken.  Clinically she improved and was able to be discharged home.  She states that she is feeling better than when she went to the ER no significant abdominal pain no more bloody stools.  No nausea or vomiting.  Appetite is improved.  She states she has not yet scheduled to follow-up there with a gastroenterologist.  She denies any known family history of inflammatory bowel disease.  Emotionally she says things are going well on the current medication.  Review of Systems   Constitutional:  Negative for chills and fever.   HENT:  Negative for congestion and ear pain.    Eyes:  Negative for visual disturbance.   Respiratory:  Negative for chest tightness and shortness of breath.    Cardiovascular:  Negative for chest pain and palpitations.   Gastrointestinal:  Negative for abdominal pain.   Musculoskeletal:  Negative for arthralgias.   Skin:  Negative for pallor.   Psychiatric/Behavioral:  Negative for confusion.        Objective   /74 (BP Location: Right arm, Patient Position: Sitting, BP Cuff Size: Adult)   Pulse 101   Temp 36.3 °C (97.4 °F) (Temporal)   Resp 16   Ht 1.6 m (5' 3\")   Wt 68.1 kg (150 lb 3.2 oz)   LMP 01/22/2024   SpO2 97%   BMI 26.61 kg/m²     Physical Exam  Vitals and nursing note reviewed.   Constitutional:       General: She is not in acute distress.     Appearance: Normal appearance. She is not ill-appearing.   HENT:      Head: Normocephalic and atraumatic.      Right Ear: Tympanic membrane, ear canal and external ear normal.      Left Ear: Tympanic membrane, ear canal and external ear normal.      Mouth/Throat: "      Pharynx: Oropharynx is clear.   Eyes:      Extraocular Movements: Extraocular movements intact.   Cardiovascular:      Rate and Rhythm: Normal rate and regular rhythm.      Pulses: Normal pulses.      Heart sounds: Normal heart sounds.   Pulmonary:      Effort: Pulmonary effort is normal.      Breath sounds: Normal breath sounds.   Abdominal:      General: Abdomen is flat. Bowel sounds are normal.      Palpations: Abdomen is soft.      Tenderness: There is no abdominal tenderness.   Musculoskeletal:         General: Normal range of motion.      Cervical back: Neck supple.   Skin:     General: Skin is warm.   Neurological:      Mental Status: She is alert and oriented to person, place, and time. Mental status is at baseline.   Psychiatric:         Mood and Affect: Mood normal.     Recent hospital reports colonoscopy report and pathology report reviewed with the patient.  Pathology report indicative of colitis but no definitive form of colitis was identified per pathology.  Patient instructed to call and schedule a posthospital follow-up with the gastroenterologist team for further evaluation and input moving forward.    Continue her current medications    Return to our office in roughly 8 weeks to reassess her case and review specialty input.          Assessment/Plan   Problem List Items Addressed This Visit             ICD-10-CM    Moderate episode of recurrent major depressive disorder (CMS/HCC) F33.1     Stable continue current medication         Relevant Medications    FLUoxetine (PROzac) 20 mg capsule    Generalized anxiety disorder F41.1     Stable continue current medication         Relevant Medications    FLUoxetine (PROzac) 20 mg capsule    Colitis - Primary K52.9     Colon biopsy pathology report was indicative of colitis but without specifics with regards to tentative etiology.  Keep follow-up with gastroenterology for further input.  In the meantime avoid aspirin and NSAIDs stick with a bland  otherwise healthy diet.  Call or go to the ER if anything worsens in the meantime.         Relevant Orders    Follow Up In Primary Care - Established    CBC    Comprehensive Metabolic Panel    Lower GI bleed K92.2     Most likely secondary to colitis noted on colonoscopy.  Cynically has resolved.  Follow-up with gastroenterology.         Blood loss anemia D50.0     Recent CBCs reviewed mild anemia.  Recheck CBC and CMP before next office appointment in 8 weeks.

## 2024-02-07 NOTE — ASSESSMENT & PLAN NOTE
Most likely secondary to colitis noted on colonoscopy.  Cynically has resolved.  Follow-up with gastroenterology.

## 2024-02-07 NOTE — ASSESSMENT & PLAN NOTE
Colon biopsy pathology report was indicative of colitis but without specifics with regards to tentative etiology.  Keep follow-up with gastroenterology for further input.  In the meantime avoid aspirin and NSAIDs stick with a bland otherwise healthy diet.  Call or go to the ER if anything worsens in the meantime.

## 2024-02-19 ENCOUNTER — OFFICE VISIT (OUTPATIENT)
Dept: GASTROENTEROLOGY | Facility: CLINIC | Age: 24
End: 2024-02-19
Payer: COMMERCIAL

## 2024-02-19 ENCOUNTER — LAB (OUTPATIENT)
Dept: LAB | Facility: LAB | Age: 24
End: 2024-02-19
Payer: COMMERCIAL

## 2024-02-19 VITALS
HEART RATE: 100 BPM | OXYGEN SATURATION: 98 % | BODY MASS INDEX: 25.78 KG/M2 | HEIGHT: 64 IN | SYSTOLIC BLOOD PRESSURE: 122 MMHG | DIASTOLIC BLOOD PRESSURE: 72 MMHG | WEIGHT: 151 LBS

## 2024-02-19 DIAGNOSIS — K52.9 COLITIS: ICD-10-CM

## 2024-02-19 DIAGNOSIS — K52.9 COLITIS: Primary | ICD-10-CM

## 2024-02-19 DIAGNOSIS — K21.9 CHRONIC GERD: ICD-10-CM

## 2024-02-19 LAB
ALBUMIN SERPL BCP-MCNC: 4.2 G/DL (ref 3.4–5)
ALP SERPL-CCNC: 63 U/L (ref 33–110)
ALT SERPL W P-5'-P-CCNC: 18 U/L (ref 7–45)
ANION GAP SERPL CALC-SCNC: 11 MMOL/L (ref 10–20)
AST SERPL W P-5'-P-CCNC: 15 U/L (ref 9–39)
BASOPHILS # BLD AUTO: 0.02 X10*3/UL (ref 0–0.1)
BASOPHILS NFR BLD AUTO: 0.3 %
BILIRUB SERPL-MCNC: 0.4 MG/DL (ref 0–1.2)
BUN SERPL-MCNC: 10 MG/DL (ref 6–23)
CALCIUM SERPL-MCNC: 8.9 MG/DL (ref 8.6–10.3)
CHLORIDE SERPL-SCNC: 105 MMOL/L (ref 98–107)
CO2 SERPL-SCNC: 25 MMOL/L (ref 21–32)
CREAT SERPL-MCNC: 0.76 MG/DL (ref 0.5–1.05)
CRP SERPL-MCNC: <0.1 MG/DL
EGFRCR SERPLBLD CKD-EPI 2021: >90 ML/MIN/1.73M*2
EOSINOPHIL # BLD AUTO: 0.03 X10*3/UL (ref 0–0.7)
EOSINOPHIL NFR BLD AUTO: 0.4 %
ERYTHROCYTE [DISTWIDTH] IN BLOOD BY AUTOMATED COUNT: 12.7 % (ref 11.5–14.5)
GLUCOSE SERPL-MCNC: 82 MG/DL (ref 74–99)
HCT VFR BLD AUTO: 40.7 % (ref 36–46)
HGB BLD-MCNC: 12.8 G/DL (ref 12–16)
IMM GRANULOCYTES # BLD AUTO: 0.02 X10*3/UL (ref 0–0.7)
IMM GRANULOCYTES NFR BLD AUTO: 0.3 % (ref 0–0.9)
LYMPHOCYTES # BLD AUTO: 1.6 X10*3/UL (ref 1.2–4.8)
LYMPHOCYTES NFR BLD AUTO: 20.6 %
MCH RBC QN AUTO: 29 PG (ref 26–34)
MCHC RBC AUTO-ENTMCNC: 31.4 G/DL (ref 32–36)
MCV RBC AUTO: 92 FL (ref 80–100)
MONOCYTES # BLD AUTO: 0.5 X10*3/UL (ref 0.1–1)
MONOCYTES NFR BLD AUTO: 6.4 %
NEUTROPHILS # BLD AUTO: 5.6 X10*3/UL (ref 1.2–7.7)
NEUTROPHILS NFR BLD AUTO: 72 %
NRBC BLD-RTO: 0 /100 WBCS (ref 0–0)
PLATELET # BLD AUTO: 367 X10*3/UL (ref 150–450)
POTASSIUM SERPL-SCNC: 4.1 MMOL/L (ref 3.5–5.3)
PROT SERPL-MCNC: 6.5 G/DL (ref 6.4–8.2)
RBC # BLD AUTO: 4.42 X10*6/UL (ref 4–5.2)
SODIUM SERPL-SCNC: 137 MMOL/L (ref 136–145)
WBC # BLD AUTO: 7.8 X10*3/UL (ref 4.4–11.3)

## 2024-02-19 PROCEDURE — 36415 COLL VENOUS BLD VENIPUNCTURE: CPT

## 2024-02-19 PROCEDURE — 86140 C-REACTIVE PROTEIN: CPT

## 2024-02-19 PROCEDURE — 1036F TOBACCO NON-USER: CPT | Performed by: INTERNAL MEDICINE

## 2024-02-19 PROCEDURE — 80053 COMPREHEN METABOLIC PANEL: CPT

## 2024-02-19 PROCEDURE — 99214 OFFICE O/P EST MOD 30 MIN: CPT | Performed by: INTERNAL MEDICINE

## 2024-02-19 PROCEDURE — 85025 COMPLETE CBC W/AUTO DIFF WBC: CPT

## 2024-02-19 NOTE — PATIENT INSTRUCTIONS
The inflammatory changes in the colon could have been caused by an acute infection, changes to blood flow in the colon, or medications. I would recommend that you avoid NSAIDs (Ibuprofen, Aleve, Motrin, and Naproxen).      Check labs.      You have been scheduled for an upper endoscopy (EGD) and a colonoscopy.  You were given instructions for preparing for this test in the office today.  If you have questions about these instructions, please call my office at [phone].    After your procedure, you can expect me to talk to you to go over the results of the procedure. If polyps were removed I will usually be able to tell you my initial thoughts about those polyps and give you some idea of when you should have another colonoscopy.    If any polyps are removed during your procedure or if any biopsies are obtained those specimens will go to the pathologists to review under the microscope. Once those results are available they will be sent to me electronically to review. These results will also be available to you at that time through the patient portal. I briefly review all of these results by the next business day to determine if there is any urgent information that needs to be communicated to you right away or anything that would significantly change what I told you at the time of the procedure. If there is nothing urgent this is usually a good sign and I will then do a more extensive review of the result and send you a letter with my final recommendations within a week of the result becoming available. If you have questions or need additional information I urge you to call the office at [phone], but I do ask for patience as I spend a good portion of each day performing procedures like the one you are scheduled for and during those times I am not able to take or return routine phone calls.    You were also given information regarding the schedule for your procedure including the time that you need to  arrive to the endoscopy unit.  You will also be contacted 2-3 day prior to your procedure to confirm the final arrival time.  If you have questions about this or if you need to cancel or change this appointment please call my office at [phone].       Follow up 3 months.

## 2024-02-19 NOTE — PROGRESS NOTES
[facility] Gastroenterology    ASSESSMENT and PLAN:       Melanie Lance is a 23 y.o. female with a significant past medical history of PTSD, anxiety/depression, and IBS who presents for follow up from a recent hospitalization.       Colitis  Recent hospitalization with COVID and nonspecific colitis concerning for ischemic vs infectious etiology. No evidence of chronic inflammatory process/IBD at this time. Medications a possible cause as well. Symptoms have improved, but not resolved. Will plan for repeat colonoscopy to check healing and will also recheck labs.  - labs ordered  - colonoscopy scheduled in endo      GERD  Persistent symptoms not responsive to PPI. EGD recommended for further evaluation.  - EGD scheduled in endo  - continue PPI      Follow up in 3 months.        Sergey Bailey MD        Gastroenterology    [facility]    Clinical   [facility]        Subjective   HISTORY OF PRESENT ILLNESS:     Chief Complaint  GI Bleeding    History Of Present Illness:    Melanie Lance is a 23 y.o. female with a significant past medical history of PTSD, anxiety/depression, and IBS who presents for follow up from a recent hospitalization.    She follows with Kamar Cooper MD as her PCP.     She was previously seen by GI (Dr. Eason) on 11/24/2023 at which time she complained of abdominal pain, nausea/vomiting, diarrhea, and rectal bleeding. At that time EGD/colonoscopy was recommended and scheduled, but not done because she had cancelled that appointment.    Recently she was admitted (discharged 2/2/2024) after she had presented with nausea/vomiting, periumbilical pain, rectal bleeding, and diarrhea. She was initially seen at [facility] and labs at [facility] showed no anemia (Hgb 13.3 g/dL), leukocytosis (16), unremarkable CMP, normal lipase, and positive COVID. Lactate was not checked. CT (without contrast) showed possible  descending colon wall thickening, but limited by lack of contrast and decompression. During her admission additional labs showed an elevated CRP (2.6), negative C diff, negative stool pathogen panel, negative O&P, and elevated calprotectin (2940). Previous CT with contrast in November was normal. Colonoscopy during admission showed inflammatory changes.      Since leaving the hospital she says that she completed the course of antibiotics and overall feels better. She continues to have some abdominal cramping that is left sided. No further diarrhea. She is having a BM daily and has continued to notice blood in her stools. She denies fever/chills. She has been taking Bentyl as needed, but she hasn't needed it recently. She continues to notice heartburn.       Patient denies any N/V, dysphagia, odynophagia, diarrhea, constipation, hematemesis, melena, or weight loss.     Endoscopy History:  12/27/2021 - Colonoscopy: normal TI, internal hemorrhoids otherwise normal colon   2/1/2024 - Colonoscopy: edematous, erythematous, and ulcerated mucosa in the distal transverse colon, splenic flexure, and proximal sigmoid colon (focal active colitis on path)        Review of systems:   Review of Systems   Constitutional:  Positive for fatigue. Negative for chills, fever and unexpected weight change.   HENT:  Negative for congestion, sneezing, sore throat, trouble swallowing and voice change.    Respiratory:  Negative for cough, shortness of breath and wheezing.    Cardiovascular:  Negative for chest pain, palpitations and leg swelling.   Gastrointestinal:         As detailed above.   Genitourinary:  Negative for dysuria and hematuria.   Musculoskeletal:  Negative for arthralgias and myalgias.   Skin:  Negative for pallor and rash.   Neurological:  Positive for dizziness. Negative for seizures, syncope, weakness, numbness and headaches.   Hematological:  Negative for adenopathy. Does not bruise/bleed easily.  "  Psychiatric/Behavioral:  Negative for confusion. The patient is nervous/anxious.    All other systems reviewed and are negative.      I performed a complete 10 point review of systems and it is negative except as noted in HPI or above.      PAST HISTORIES:       Past Medical History:  She has a past medical history of Depression, Gestational hypertension (10/13/2023), and Personal history of other diseases of the respiratory system (01/02/2015).    Past Surgical History:  She has a past surgical history that includes Tympanostomy tube placement (Bilateral); Eye surgery; Upper gastrointestinal endoscopy; and Colonoscopy w/ biopsies (02/01/2024).      Social History:  She reports that she has never smoked. She has never been exposed to tobacco smoke. She has never used smokeless tobacco. She reports that she does not drink alcohol and does not use drugs.    Family History:  No known GI disease, specifically denies pancreatitis, Crohn's, colon cancer, gastroesophageal cancer, or ulcerative colitis.    Family History   Problem Relation Name Age of Onset    Testicular cancer Father      Other (mental disorder) Father      Hypertension Father      Other (small cell lung cancer) Maternal Grandmother      Breast cancer Maternal Grandmother      Other (Cardiac disorder) Maternal Grandfather      Other (Cardiac disorder) Paternal Grandmother      Other (Cardiac disorder) Paternal Grandfather      Diabetes Paternal Grandfather          Allergies:  Amoxicillin, Azithromycin, Cefdinir, and Miconazole      Objective   OBJECTIVE:       Last Recorded Vitals:  Vitals:    02/19/24 1111   BP: 122/72   Pulse: 100   SpO2: 98%   Weight: 68.5 kg (151 lb)   Height: 1.613 m (5' 3.5\")     /72   Pulse 100   Ht 1.613 m (5' 3.5\")   Wt 68.5 kg (151 lb)   LMP 01/22/2024   SpO2 98%   BMI 26.33 kg/m²      Physical Exam:    Physical Exam  Vitals reviewed.   Constitutional:       General: She is not in acute distress.     Appearance: " She is not ill-appearing.   HENT:      Head: Normocephalic and atraumatic.   Eyes:      General: No scleral icterus.  Cardiovascular:      Rate and Rhythm: Normal rate and regular rhythm.      Pulses: Normal pulses.      Heart sounds: Normal heart sounds. No murmur heard.  Pulmonary:      Effort: Pulmonary effort is normal. No respiratory distress.      Breath sounds: Normal breath sounds. No wheezing.   Abdominal:      General: Bowel sounds are normal.      Palpations: Abdomen is soft.      Tenderness: There is no abdominal tenderness. There is no rebound.   Musculoskeletal:         General: No swelling or deformity.   Skin:     General: Skin is warm and dry.      Coloration: Skin is not jaundiced.      Findings: No rash.   Neurological:      General: No focal deficit present.      Mental Status: She is alert and oriented to person, place, and time.   Psychiatric:         Mood and Affect: Mood normal.         Behavior: Behavior normal.         Thought Content: Thought content normal.         Judgment: Judgment normal.         Home Medications:  Prior to Admission medications    Medication Sig Start Date End Date Taking? Authorizing Provider   buPROPion XL (Wellbutrin XL) 300 mg 24 hr tablet Take 1 tablet (300 mg) by mouth once daily. 7/6/23  Yes Historical Provider, MD   desogestreL-ethinyl estradioL (Apri) 0.15-0.03 mg tablet Take 1 tablet by mouth once daily. 11/30/23 11/29/24 Yes Kamar Cooper MD   dicyclomine (Bentyl) 10 mg capsule Take 1 capsule (10 mg) by mouth 4 times a day as needed (abdominal pain). 11/24/23 11/23/24 Yes Raymond Eason DO   FLUoxetine (PROzac) 20 mg capsule Take 1 capsule (20 mg) by mouth once daily. 2/6/24 8/4/24 Yes Kamar Cooper MD   valACYclovir (Valtrex) 500 mg tablet Take by mouth once daily. 8/7/23  Yes Historical Provider, MD   doxylamine (Unisom) 25 mg tablet Take by mouth as needed at bedtime. 8/24/23 2/19/24  Historical Provider, MD   multivit 45/iron/folate 6/dha  (PRENATE DHA ORAL) Take 1 tablet by mouth once daily.  2/19/24  Historical Provider, MD         Relevant Results Recent labs reviewed in the EMR.    Lab Results   Component Value Date/Time    WBC 7.8 02/19/2024 1142    HGB 12.8 02/19/2024 1142    HGB 11.6 (L) 02/02/2024 0409    HGB 12.1 02/01/2024 0503    MCV 92 02/19/2024 1142     02/19/2024 1142     02/02/2024 0409     02/01/2024 0503       Lab Results   Component Value Date/Time     02/19/2024 1142    K 4.1 02/19/2024 1142     02/19/2024 1142    BUN 10 02/19/2024 1142    CREATININE 0.76 02/19/2024 1142    CREATININE 0.77 02/02/2024 0409       Lab Results   Component Value Date/Time    BILITOT 0.4 02/19/2024 1142    BILITOT 0.5 01/31/2024 0639    BILITOT 0.4 01/16/2024 1151    ALKPHOS 63 02/19/2024 1142    ALKPHOS 53 01/31/2024 0639    ALKPHOS 51 01/16/2024 1151    AST 15 02/19/2024 1142    AST 13 01/31/2024 0639    AST 17 01/16/2024 1151    ALT 18 02/19/2024 1142    ALT 12 01/31/2024 0639    ALT 14 01/16/2024 1151       Lab Results   Component Value Date/Time    CRP <0.10 02/19/2024 1142    CRP 2.63 (H) 01/30/2024 1538    CALPS 2940 (H) 01/30/2024 1610       Radiology: Imaging reviewed in the EMR.  Colonoscopy Diagnostic    Result Date: 2/1/2024  Impression Edematous, erythematous and ulcerated mucosa in the distal transverse colon, splenic flexure, descending colon and proximal sigmoid colon, concerning for ischemic colitis; performed cold forceps biopsy Otherwise, the entire examined colon appeared normal. Findings Segmental edematous, erythematous and ulcerated mucosa in the distal transverse colon, splenic flexure, descending colon and proximal sigmoid colon, concerning for ischemic colitis; performed cold forceps biopsy. Verification of patient identification for the specimen was done by the physician, nurse and technician using the patient's name, birth date and medical record number.; Otherwise, the entire colon appeared  normal.;        CT ABDOMEN PELVIS WO IV CONTRAST    Result Date: 1/30/2024  * * *Final Report* * * DATE OF EXAM: Jan 30 2024  3:41AM   [facility]   0531  -  CT ABD/PEL WO IVCON  / ACCESSION #  [id number] PROCEDURE REASON: Nausea/vomiting      * * * * Physician Interpretation * * * *  EXAMINATION:  CT ABDOMEN AND PELVIS WITHOUT IV CONTRAST CLINICAL HISTORY:  Abdominal pain, nausea/vomiting, diarrhea TECHNIQUE: Non-IV contrast imaging of the abdomen and pelvis was performed using standard technique, scanning from just above the dome of the diaphragm to the symphysis pubis.  Unenhanced imaging is limited for the evaluation of some intra-abdominal and pelvic pathology. MQ:  CTAPWO_3 Contrast: IV: None : ml of CT Radiation dose: Integrated Dose-length product (DLP) for this visit =   410 mGy*cm. CT Dose Reduction Employed: Automated exposure control (AEC) COMPARISON: Abdominal CT 11/10/2023. RESULT: Solid abdominal organs: Unremarkable. Biliary: Unremarkable. GI tract and mesentery: No bowel obstruction.  Descending colon appears mildly thickened, although limited due to decompression. Normal appendix. Peritoneal cavity: No free air or free fluid. Retroperitoneum: Unremarkable. Vasculature: Unremarkable. Pelvis: Unremarkable. Bones/Soft Tissues: No acute abnormality. Lower thorax: Unremarkable.    IMPRESSION: Suspected mild nonspecific left colitis. : PSCB   Transcribe Date/Time: Jan 30 2024  3:41A Dictated by : DANIA AMBROSE MD This examination was interpreted and the report reviewed and electronically signed by: DANIA AMBROSE MD on Jan 30 2024  3:49AM  EST

## 2024-02-19 NOTE — LETTER
February 20, 2024     Kamar Cooper MD  [address]  [facility], [address] [place] [id number]    Patient: Melanie Lance   YOB: 2000   Date of Visit: 2/19/2024       Dear Dr. Kamar Cooper MD:    Thank you for referring Melanie Lance to me for evaluation. Below are my notes for this consultation.  If you have questions, please do not hesitate to call me. I look forward to following your patient along with you.       Sincerely,     Sergey Bailey MD      CC: No Recipients  ______________________________________________________________________________________        [facility] Gastroenterology    ASSESSMENT and PLAN:       Melanie Lance is a 23 y.o. female with a significant past medical history of PTSD, anxiety/depression, and IBS who presents for follow up from a recent hospitalization.       Colitis  Recent hospitalization with COVID and nonspecific colitis concerning for ischemic vs infectious etiology. No evidence of chronic inflammatory process/IBD at this time. Medications a possible cause as well. Symptoms have improved, but not resolved. Will plan for repeat colonoscopy to check healing and will also recheck labs.  - labs ordered  - colonoscopy scheduled in endo      GERD  Persistent symptoms not responsive to PPI. EGD recommended for further evaluation.  - EGD scheduled in endo  - continue PPI      Follow up in 3 months.        Sergey Bailey MD        Gastroenterology    [facility] [place] [facility]    Clinical   [facility]        Subjective  HISTORY OF PRESENT ILLNESS:     Chief Complaint  GI Bleeding    History Of Present Illness:    Melanie Lance is a 23 y.o. female with a significant past medical history of PTSD, anxiety/depression, and IBS who presents for follow up from a recent hospitalization.    She follows with Kamar Cooper MD as her PCP.     She was previously seen by GI  (Dr. Eason) on 11/24/2023 at which time she complained of abdominal pain, nausea/vomiting, diarrhea, and rectal bleeding. At that time EGD/colonoscopy was recommended and scheduled, but not done because she had cancelled that appointment.    Recently she was admitted (discharged 2/2/2024) after she had presented with nausea/vomiting, periumbilical pain, rectal bleeding, and diarrhea. She was initially seen at [facility] and labs at [facility] showed no anemia (Hgb 13.3 g/dL), leukocytosis (16), unremarkable CMP, normal lipase, and positive COVID. Lactate was not checked. CT (without contrast) showed possible descending colon wall thickening, but limited by lack of contrast and decompression. During her admission additional labs showed an elevated CRP (2.6), negative C diff, negative stool pathogen panel, negative O&P, and elevated calprotectin (2940). Previous CT with contrast in November was normal. Colonoscopy during admission showed inflammatory changes.      Since leaving the hospital she says that she completed the course of antibiotics and overall feels better. She continues to have some abdominal cramping that is left sided. No further diarrhea. She is having a BM daily and has continued to notice blood in her stools. She denies fever/chills. She has been taking Bentyl as needed, but she hasn't needed it recently. She continues to notice heartburn.       Patient denies any N/V, dysphagia, odynophagia, diarrhea, constipation, hematemesis, melena, or weight loss.     Endoscopy History:  12/27/2021 - Colonoscopy: normal TI, internal hemorrhoids otherwise normal colon   2/1/2024 - Colonoscopy: edematous, erythematous, and ulcerated mucosa in the distal transverse colon, splenic flexure, and proximal sigmoid colon (focal active colitis on path)        Review of systems:   Review of Systems   Constitutional:  Positive for fatigue. Negative for chills, fever and unexpected weight change.   HENT:  Negative for congestion,  sneezing, sore throat, trouble swallowing and voice change.    Respiratory:  Negative for cough, shortness of breath and wheezing.    Cardiovascular:  Negative for chest pain, palpitations and leg swelling.   Gastrointestinal:         As detailed above.   Genitourinary:  Negative for dysuria and hematuria.   Musculoskeletal:  Negative for arthralgias and myalgias.   Skin:  Negative for pallor and rash.   Neurological:  Positive for dizziness. Negative for seizures, syncope, weakness, numbness and headaches.   Hematological:  Negative for adenopathy. Does not bruise/bleed easily.   Psychiatric/Behavioral:  Negative for confusion. The patient is nervous/anxious.    All other systems reviewed and are negative.      I performed a complete 10 point review of systems and it is negative except as noted in HPI or above.      PAST HISTORIES:       Past Medical History:  She has a past medical history of Depression, Gestational hypertension (10/13/2023), and Personal history of other diseases of the respiratory system (01/02/2015).    Past Surgical History:  She has a past surgical history that includes Tympanostomy tube placement (Bilateral); Eye surgery; Upper gastrointestinal endoscopy; and Colonoscopy w/ biopsies (02/01/2024).      Social History:  She reports that she has never smoked. She has never been exposed to tobacco smoke. She has never used smokeless tobacco. She reports that she does not drink alcohol and does not use drugs.    Family History:  No known GI disease, specifically denies pancreatitis, Crohn's, colon cancer, gastroesophageal cancer, or ulcerative colitis.    Family History   Problem Relation Name Age of Onset   • Testicular cancer Father     • Other (mental disorder) Father     • Hypertension Father     • Other (small cell lung cancer) Maternal Grandmother     • Breast cancer Maternal Grandmother     • Other (Cardiac disorder) Maternal Grandfather     • Other (Cardiac disorder) Paternal Grandmother   "   • Other (Cardiac disorder) Paternal Grandfather     • Diabetes Paternal Grandfather          Allergies:  Amoxicillin, Azithromycin, Cefdinir, and Miconazole      Objective  OBJECTIVE:       Last Recorded Vitals:  Vitals:    02/19/24 1111   BP: 122/72   Pulse: 100   SpO2: 98%   Weight: 68.5 kg (151 lb)   Height: 1.613 m (5' 3.5\")     /72   Pulse 100   Ht 1.613 m (5' 3.5\")   Wt 68.5 kg (151 lb)   LMP 01/22/2024   SpO2 98%   BMI 26.33 kg/m²      Physical Exam:    Physical Exam  Vitals reviewed.   Constitutional:       General: She is not in acute distress.     Appearance: She is not ill-appearing.   HENT:      Head: Normocephalic and atraumatic.   Eyes:      General: No scleral icterus.  Cardiovascular:      Rate and Rhythm: Normal rate and regular rhythm.      Pulses: Normal pulses.      Heart sounds: Normal heart sounds. No murmur heard.  Pulmonary:      Effort: Pulmonary effort is normal. No respiratory distress.      Breath sounds: Normal breath sounds. No wheezing.   Abdominal:      General: Bowel sounds are normal.      Palpations: Abdomen is soft.      Tenderness: There is no abdominal tenderness. There is no rebound.   Musculoskeletal:         General: No swelling or deformity.   Skin:     General: Skin is warm and dry.      Coloration: Skin is not jaundiced.      Findings: No rash.   Neurological:      General: No focal deficit present.      Mental Status: She is alert and oriented to person, place, and time.   Psychiatric:         Mood and Affect: Mood normal.         Behavior: Behavior normal.         Thought Content: Thought content normal.         Judgment: Judgment normal.         Home Medications:  Prior to Admission medications    Medication Sig Start Date End Date Taking? Authorizing Provider   buPROPion XL (Wellbutrin XL) 300 mg 24 hr tablet Take 1 tablet (300 mg) by mouth once daily. 7/6/23  Yes Historical Provider, MD   desogestreL-ethinyl estradioL (Apri) 0.15-0.03 mg tablet Take 1 " tablet by mouth once daily. 11/30/23 11/29/24 Yes Kamar Cooper MD   dicyclomine (Bentyl) 10 mg capsule Take 1 capsule (10 mg) by mouth 4 times a day as needed (abdominal pain). 11/24/23 11/23/24 Yes Raymond Eason DO   FLUoxetine (PROzac) 20 mg capsule Take 1 capsule (20 mg) by mouth once daily. 2/6/24 8/4/24 Yes Kamar Cooper MD   valACYclovir (Valtrex) 500 mg tablet Take by mouth once daily. 8/7/23  Yes Historical Provider, MD   doxylamine (Unisom) 25 mg tablet Take by mouth as needed at bedtime. 8/24/23 2/19/24  Historical Provider, MD   multivit 45/iron/folate 6/dha (PRENATE DHA ORAL) Take 1 tablet by mouth once daily.  2/19/24  Historical Provider, MD         Relevant Results Recent labs reviewed in the EMR.    Lab Results   Component Value Date/Time    WBC 7.8 02/19/2024 1142    HGB 12.8 02/19/2024 1142    HGB 11.6 (L) 02/02/2024 0409    HGB 12.1 02/01/2024 0503    MCV 92 02/19/2024 1142     02/19/2024 1142     02/02/2024 0409     02/01/2024 0503       Lab Results   Component Value Date/Time     02/19/2024 1142    K 4.1 02/19/2024 1142     02/19/2024 1142    BUN 10 02/19/2024 1142    CREATININE 0.76 02/19/2024 1142    CREATININE 0.77 02/02/2024 0409       Lab Results   Component Value Date/Time    BILITOT 0.4 02/19/2024 1142    BILITOT 0.5 01/31/2024 0639    BILITOT 0.4 01/16/2024 1151    ALKPHOS 63 02/19/2024 1142    ALKPHOS 53 01/31/2024 0639    ALKPHOS 51 01/16/2024 1151    AST 15 02/19/2024 1142    AST 13 01/31/2024 0639    AST 17 01/16/2024 1151    ALT 18 02/19/2024 1142    ALT 12 01/31/2024 0639    ALT 14 01/16/2024 1151       Lab Results   Component Value Date/Time    CRP <0.10 02/19/2024 1142    CRP 2.63 (H) 01/30/2024 1538    CALPS 2940 (H) 01/30/2024 1610       Radiology: Imaging reviewed in the EMR.  Colonoscopy Diagnostic    Result Date: 2/1/2024  Impression Edematous, erythematous and ulcerated mucosa in the distal transverse colon, splenic flexure,  descending colon and proximal sigmoid colon, concerning for ischemic colitis; performed cold forceps biopsy Otherwise, the entire examined colon appeared normal. Findings Segmental edematous, erythematous and ulcerated mucosa in the distal transverse colon, splenic flexure, descending colon and proximal sigmoid colon, concerning for ischemic colitis; performed cold forceps biopsy. Verification of patient identification for the specimen was done by the physician, nurse and technician using the patient's name, birth date and medical record number.; Otherwise, the entire colon appeared normal.;        CT ABDOMEN PELVIS WO IV CONTRAST    Result Date: 1/30/2024  * * *Final Report* * * DATE OF EXAM: Jan 30 2024  3:41AM   [facility]   0531  -  CT ABD/PEL WO IVCON  / ACCESSION #  [id number] PROCEDURE REASON: Nausea/vomiting      * * * * Physician Interpretation * * * *  EXAMINATION:  CT ABDOMEN AND PELVIS WITHOUT IV CONTRAST CLINICAL HISTORY:  Abdominal pain, nausea/vomiting, diarrhea TECHNIQUE: Non-IV contrast imaging of the abdomen and pelvis was performed using standard technique, scanning from just above the dome of the diaphragm to the symphysis pubis.  Unenhanced imaging is limited for the evaluation of some intra-abdominal and pelvic pathology. MQ:  CTAPWO_3 Contrast: IV: None : ml of CT Radiation dose: Integrated Dose-length product (DLP) for this visit =   410 mGy*cm. CT Dose Reduction Employed: Automated exposure control (AEC) COMPARISON: Abdominal CT 11/10/2023. RESULT: Solid abdominal organs: Unremarkable. Biliary: Unremarkable. GI tract and mesentery: No bowel obstruction.  Descending colon appears mildly thickened, although limited due to decompression. Normal appendix. Peritoneal cavity: No free air or free fluid. Retroperitoneum: Unremarkable. Vasculature: Unremarkable. Pelvis: Unremarkable. Bones/Soft Tissues: No acute abnormality. Lower thorax: Unremarkable.    IMPRESSION: Suspected mild nonspecific left colitis.  : BOSSMAN   Transcribe Date/Time: Jan 30 2024  3:41A Dictated by : DANIA AMBROSE MD This examination was interpreted and the report reviewed and electronically signed by: DANIA AMBROSE MD on Jan 30 2024  3:49AM  EST

## 2024-02-19 NOTE — H&P (VIEW-ONLY)
[facility]astroenterology    ASSESSMENT and PLAN:       Melanie Lance is a 23 y.o. female with a significant past medical history of PTSD, anxiety/depression, and IBS who presents for follow up from a recent hospitalization.       Colitis  Recent hospitalization with COVID and nonspecific colitis concerning for ischemic vs infectious etiology. No evidence of chronic inflammatory process/IBD at this time. Medications a possible cause as well. Symptoms have improved, but not resolved. Will plan for repeat colonoscopy to check healing and will also recheck labs.  - labs ordered  - colonoscopy scheduled in endo      GERD  Persistent symptoms not responsive to PPI. EGD recommended for further evaluation.  - EGD scheduled in endo  - continue PPI      Follow up in 3 months.        Sergey Bailey MD        Gastroenterology    [facility]    Clinical   [facility]        Subjective   HISTORY OF PRESENT ILLNESS:     Chief Complaint  GI Bleeding    History Of Present Illness:    Melanie Lance is a 23 y.o. female with a significant past medical history of PTSD, anxiety/depression, and IBS who presents for follow up from a recent hospitalization.    She follows with Kamar Cooper MD as her PCP.     She was previously seen by GI (Dr. Eason) on 11/24/2023 at which time she complained of abdominal pain, nausea/vomiting, diarrhea, and rectal bleeding. At that time EGD/colonoscopy was recommended and scheduled, but not done because she had cancelled that appointment.    Recently she was admitted (discharged 2/2/2024) after she had presented with nausea/vomiting, periumbilical pain, rectal bleeding, and diarrhea. She was initially seen at [facility] and labs at [facility] showed no anemia (Hgb 13.3 g/dL), leukocytosis (16), unremarkable CMP, normal lipase, and positive COVID. Lactate was not checked. CT (without contrast) showed possible  descending colon wall thickening, but limited by lack of contrast and decompression. During her admission additional labs showed an elevated CRP (2.6), negative C diff, negative stool pathogen panel, negative O&P, and elevated calprotectin (2940). Previous CT with contrast in November was normal. Colonoscopy during admission showed inflammatory changes.      Since leaving the hospital she says that she completed the course of antibiotics and overall feels better. She continues to have some abdominal cramping that is left sided. No further diarrhea. She is having a BM daily and has continued to notice blood in her stools. She denies fever/chills. She has been taking Bentyl as needed, but she hasn't needed it recently. She continues to notice heartburn.       Patient denies any N/V, dysphagia, odynophagia, diarrhea, constipation, hematemesis, melena, or weight loss.     Endoscopy History:  12/27/2021 - Colonoscopy: normal TI, internal hemorrhoids otherwise normal colon   2/1/2024 - Colonoscopy: edematous, erythematous, and ulcerated mucosa in the distal transverse colon, splenic flexure, and proximal sigmoid colon (focal active colitis on path)        Review of systems:   Review of Systems   Constitutional:  Positive for fatigue. Negative for chills, fever and unexpected weight change.   HENT:  Negative for congestion, sneezing, sore throat, trouble swallowing and voice change.    Respiratory:  Negative for cough, shortness of breath and wheezing.    Cardiovascular:  Negative for chest pain, palpitations and leg swelling.   Gastrointestinal:         As detailed above.   Genitourinary:  Negative for dysuria and hematuria.   Musculoskeletal:  Negative for arthralgias and myalgias.   Skin:  Negative for pallor and rash.   Neurological:  Positive for dizziness. Negative for seizures, syncope, weakness, numbness and headaches.   Hematological:  Negative for adenopathy. Does not bruise/bleed easily.  "  Psychiatric/Behavioral:  Negative for confusion. The patient is nervous/anxious.    All other systems reviewed and are negative.      I performed a complete 10 point review of systems and it is negative except as noted in HPI or above.      PAST HISTORIES:       Past Medical History:  She has a past medical history of Depression, Gestational hypertension (10/13/2023), and Personal history of other diseases of the respiratory system (01/02/2015).    Past Surgical History:  She has a past surgical history that includes Tympanostomy tube placement (Bilateral); Eye surgery; Upper gastrointestinal endoscopy; and Colonoscopy w/ biopsies (02/01/2024).      Social History:  She reports that she has never smoked. She has never been exposed to tobacco smoke. She has never used smokeless tobacco. She reports that she does not drink alcohol and does not use drugs.    Family History:  No known GI disease, specifically denies pancreatitis, Crohn's, colon cancer, gastroesophageal cancer, or ulcerative colitis.    Family History   Problem Relation Name Age of Onset    Testicular cancer Father      Other (mental disorder) Father      Hypertension Father      Other (small cell lung cancer) Maternal Grandmother      Breast cancer Maternal Grandmother      Other (Cardiac disorder) Maternal Grandfather      Other (Cardiac disorder) Paternal Grandmother      Other (Cardiac disorder) Paternal Grandfather      Diabetes Paternal Grandfather          Allergies:  Amoxicillin, Azithromycin, Cefdinir, and Miconazole      Objective   OBJECTIVE:       Last Recorded Vitals:  Vitals:    02/19/24 1111   BP: 122/72   Pulse: 100   SpO2: 98%   Weight: 68.5 kg (151 lb)   Height: 1.613 m (5' 3.5\")     /72   Pulse 100   Ht 1.613 m (5' 3.5\")   Wt 68.5 kg (151 lb)   LMP 01/22/2024   SpO2 98%   BMI 26.33 kg/m²      Physical Exam:    Physical Exam  Vitals reviewed.   Constitutional:       General: She is not in acute distress.     Appearance: " She is not ill-appearing.   HENT:      Head: Normocephalic and atraumatic.   Eyes:      General: No scleral icterus.  Cardiovascular:      Rate and Rhythm: Normal rate and regular rhythm.      Pulses: Normal pulses.      Heart sounds: Normal heart sounds. No murmur heard.  Pulmonary:      Effort: Pulmonary effort is normal. No respiratory distress.      Breath sounds: Normal breath sounds. No wheezing.   Abdominal:      General: Bowel sounds are normal.      Palpations: Abdomen is soft.      Tenderness: There is no abdominal tenderness. There is no rebound.   Musculoskeletal:         General: No swelling or deformity.   Skin:     General: Skin is warm and dry.      Coloration: Skin is not jaundiced.      Findings: No rash.   Neurological:      General: No focal deficit present.      Mental Status: She is alert and oriented to person, place, and time.   Psychiatric:         Mood and Affect: Mood normal.         Behavior: Behavior normal.         Thought Content: Thought content normal.         Judgment: Judgment normal.         Home Medications:  Prior to Admission medications    Medication Sig Start Date End Date Taking? Authorizing Provider   buPROPion XL (Wellbutrin XL) 300 mg 24 hr tablet Take 1 tablet (300 mg) by mouth once daily. 7/6/23  Yes Historical Provider, MD   desogestreL-ethinyl estradioL (Apri) 0.15-0.03 mg tablet Take 1 tablet by mouth once daily. 11/30/23 11/29/24 Yes Kamar Cooper MD   dicyclomine (Bentyl) 10 mg capsule Take 1 capsule (10 mg) by mouth 4 times a day as needed (abdominal pain). 11/24/23 11/23/24 Yes Raymond Eason DO   FLUoxetine (PROzac) 20 mg capsule Take 1 capsule (20 mg) by mouth once daily. 2/6/24 8/4/24 Yes Kamar Cooper MD   valACYclovir (Valtrex) 500 mg tablet Take by mouth once daily. 8/7/23  Yes Historical Provider, MD   doxylamine (Unisom) 25 mg tablet Take by mouth as needed at bedtime. 8/24/23 2/19/24  Historical Provider, MD   multivit 45/iron/folate 6/dha  (PRENATE DHA ORAL) Take 1 tablet by mouth once daily.  2/19/24  Historical Provider, MD         Relevant Results Recent labs reviewed in the EMR.    Lab Results   Component Value Date/Time    WBC 7.8 02/19/2024 1142    HGB 12.8 02/19/2024 1142    HGB 11.6 (L) 02/02/2024 0409    HGB 12.1 02/01/2024 0503    MCV 92 02/19/2024 1142     02/19/2024 1142     02/02/2024 0409     02/01/2024 0503       Lab Results   Component Value Date/Time     02/19/2024 1142    K 4.1 02/19/2024 1142     02/19/2024 1142    BUN 10 02/19/2024 1142    CREATININE 0.76 02/19/2024 1142    CREATININE 0.77 02/02/2024 0409       Lab Results   Component Value Date/Time    BILITOT 0.4 02/19/2024 1142    BILITOT 0.5 01/31/2024 0639    BILITOT 0.4 01/16/2024 1151    ALKPHOS 63 02/19/2024 1142    ALKPHOS 53 01/31/2024 0639    ALKPHOS 51 01/16/2024 1151    AST 15 02/19/2024 1142    AST 13 01/31/2024 0639    AST 17 01/16/2024 1151    ALT 18 02/19/2024 1142    ALT 12 01/31/2024 0639    ALT 14 01/16/2024 1151       Lab Results   Component Value Date/Time    CRP <0.10 02/19/2024 1142    CRP 2.63 (H) 01/30/2024 1538    CALPS 2940 (H) 01/30/2024 1610       Radiology: Imaging reviewed in the EMR.  Colonoscopy Diagnostic    Result Date: 2/1/2024  Impression Edematous, erythematous and ulcerated mucosa in the distal transverse colon, splenic flexure, descending colon and proximal sigmoid colon, concerning for ischemic colitis; performed cold forceps biopsy Otherwise, the entire examined colon appeared normal. Findings Segmental edematous, erythematous and ulcerated mucosa in the distal transverse colon, splenic flexure, descending colon and proximal sigmoid colon, concerning for ischemic colitis; performed cold forceps biopsy. Verification of patient identification for the specimen was done by the physician, nurse and technician using the patient's name, birth date and medical record number.; Otherwise, the entire colon appeared  normal.;        CT ABDOMEN PELVIS WO IV CONTRAST    Result Date: 1/30/2024  * * *Final Report* * * DATE OF EXAM: Jan 30 2024  3:41AM   [facility]   0531  -  CT ABD/PEL WO IVCON  / ACCESSION #  [id number] PROCEDURE REASON: Nausea/vomiting      * * * * Physician Interpretation * * * *  EXAMINATION:  CT ABDOMEN AND PELVIS WITHOUT IV CONTRAST CLINICAL HISTORY:  Abdominal pain, nausea/vomiting, diarrhea TECHNIQUE: Non-IV contrast imaging of the abdomen and pelvis was performed using standard technique, scanning from just above the dome of the diaphragm to the symphysis pubis.  Unenhanced imaging is limited for the evaluation of some intra-abdominal and pelvic pathology. MQ:  CTAPWO_3 Contrast: IV: None : ml of CT Radiation dose: Integrated Dose-length product (DLP) for this visit =   410 mGy*cm. CT Dose Reduction Employed: Automated exposure control (AEC) COMPARISON: Abdominal CT 11/10/2023. RESULT: Solid abdominal organs: Unremarkable. Biliary: Unremarkable. GI tract and mesentery: No bowel obstruction.  Descending colon appears mildly thickened, although limited due to decompression. Normal appendix. Peritoneal cavity: No free air or free fluid. Retroperitoneum: Unremarkable. Vasculature: Unremarkable. Pelvis: Unremarkable. Bones/Soft Tissues: No acute abnormality. Lower thorax: Unremarkable.    IMPRESSION: Suspected mild nonspecific left colitis. : PSCB   Transcribe Date/Time: Jan 30 2024  3:41A Dictated by : DANIA AMBROSE MD This examination was interpreted and the report reviewed and electronically signed by: DANIA AMBROSE MD on Jan 30 2024  3:49AM  EST

## 2024-02-20 ASSESSMENT — ENCOUNTER SYMPTOMS
SEIZURES: 0
COUGH: 0
SORE THROAT: 0
CHILLS: 0
BRUISES/BLEEDS EASILY: 0
UNEXPECTED WEIGHT CHANGE: 0
WHEEZING: 0
TROUBLE SWALLOWING: 0
ROS GI COMMENTS: AS DETAILED ABOVE.
NERVOUS/ANXIOUS: 1
HEMATURIA: 0
DIZZINESS: 1
HEADACHES: 0
ARTHRALGIAS: 0
PALPITATIONS: 0
NUMBNESS: 0
DYSURIA: 0
WEAKNESS: 0
CONFUSION: 0
ADENOPATHY: 0
FATIGUE: 1
SHORTNESS OF BREATH: 0
MYALGIAS: 0
VOICE CHANGE: 0
FEVER: 0

## 2024-02-21 ENCOUNTER — TELEPHONE (OUTPATIENT)
Dept: OBSTETRICS AND GYNECOLOGY | Facility: CLINIC | Age: 24
End: 2024-02-21
Payer: COMMERCIAL

## 2024-02-21 NOTE — PROGRESS NOTES
SUBJECTIVE  Melanie Lance is a 23 y.o. female here for GYN problem visit  C/o vaginal discharge with itching for one week  She was admitted in the hospital the beginning of February for colitis and treated with multiple antibiotics.  She is currently on combination OCP and would like to switch to Mirena IUD due to interaction of OCP with her other medications  She has a significant history of PTSD, generalized anxiety disorder and depression and would like her IUD inserted under anesthesia  Gyn:   Menstrual Pattern: Heavy, LMP: 2/20/24  STIs: denies  Sexual Activity: men, monogamous, no complaints  Contraception: [facility]    [unfilled]  Past Medical History:   Diagnosis Date    Depression     Gestational hypertension 10/13/2023    Personal history of other diseases of the respiratory system 01/02/2015    History of acute bacterial sinusitis      Past Surgical History:   Procedure Laterality Date    COLONOSCOPY W/ BIOPSIES  02/01/2024    EYE SURGERY      TYMPANOSTOMY TUBE PLACEMENT Bilateral     UPPER GASTROINTESTINAL ENDOSCOPY          OBJECTIVE  /80   Wt 68 kg (150 lb)   LMP 02/21/2024   Breastfeeding No   BMI 26.15 kg/m²      General:   Alert and oriented, in no acute distress   Neck: Supple. No visible thyromegaly.    Breast/Axilla: Normal to palpation bilaterally without masses, skin changes, or nipple discharge.    Abdomen: Soft, non-tender, without masses or organomegaly   Vulva: Normal architecture without erythema, masses, or lesions.    Vagina: Normal mucosa without lesions, masses, or atrophy. No abnormal vaginal discharge.    Cervix: Normal without masses, lesions, or signs of cervicitis.    Uterus: Normal mobile, non-enlarged uterus    Adnexa: Normal without masses or lesions   Pelvic Floor No POP noted. No high tone pelvic floor    Psych Normal affect. Normal mood.      Problem List Items Addressed This Visit       Vaginal discharge    Relevant Orders    Vaginitis Gram Stain For Bacterial Vaginosis +  Yeast    Neisseria gonorrhoeae, Amplified    Chlamydia Trachomatis, Amplified    Trichomonas vaginalis, Amplified     Other Visit Diagnoses       Screen for STD (sexually transmitted disease)        Relevant Orders    Neisseria gonorrhoeae, Amplified    Chlamydia Trachomatis, Amplified    Trichomonas vaginalis, Amplified           IMPRESSIONS:  Gram stain and GC/CT/trichomonas testing ordered  Desires Mirena IUD insertion under anesthesia, will schedule.  Surgical counseling done.      Clara Mayo MD

## 2024-02-21 NOTE — TELEPHONE ENCOUNTER
Patient has a yeast infection and would like to know if medication can be called in for her or if she would have to come in for an appointment. Please advise.

## 2024-02-22 ENCOUNTER — OFFICE VISIT (OUTPATIENT)
Dept: OBSTETRICS AND GYNECOLOGY | Facility: CLINIC | Age: 24
End: 2024-02-22
Payer: COMMERCIAL

## 2024-02-22 ENCOUNTER — PREP FOR PROCEDURE (OUTPATIENT)
Dept: OBSTETRICS AND GYNECOLOGY | Facility: CLINIC | Age: 24
End: 2024-02-22

## 2024-02-22 VITALS — WEIGHT: 150 LBS | SYSTOLIC BLOOD PRESSURE: 106 MMHG | DIASTOLIC BLOOD PRESSURE: 80 MMHG | BODY MASS INDEX: 26.15 KG/M2

## 2024-02-22 DIAGNOSIS — Z30.430 ENCOUNTER FOR INSERTION OF INTRAUTERINE CONTRACEPTIVE DEVICE (IUD): Primary | ICD-10-CM

## 2024-02-22 DIAGNOSIS — Z11.3 SCREEN FOR STD (SEXUALLY TRANSMITTED DISEASE): ICD-10-CM

## 2024-02-22 DIAGNOSIS — F41.1 GAD (GENERALIZED ANXIETY DISORDER): ICD-10-CM

## 2024-02-22 DIAGNOSIS — N89.8 VAGINAL DISCHARGE: ICD-10-CM

## 2024-02-22 PROBLEM — Z30.41 ENCOUNTER FOR SURVEILLANCE OF CONTRACEPTIVE PILLS: Status: ACTIVE | Noted: 2024-02-22

## 2024-02-22 PROCEDURE — 99214 OFFICE O/P EST MOD 30 MIN: CPT | Performed by: OBSTETRICS & GYNECOLOGY

## 2024-02-22 PROCEDURE — 87661 TRICHOMONAS VAGINALIS AMPLIF: CPT

## 2024-02-22 PROCEDURE — 87491 CHLMYD TRACH DNA AMP PROBE: CPT

## 2024-02-22 PROCEDURE — 1036F TOBACCO NON-USER: CPT | Performed by: OBSTETRICS & GYNECOLOGY

## 2024-02-22 PROCEDURE — 87591 N.GONORRHOEAE DNA AMP PROB: CPT

## 2024-02-22 PROCEDURE — 87205 SMEAR GRAM STAIN: CPT

## 2024-02-22 RX ORDER — CELECOXIB 400 MG/1
400 CAPSULE ORAL ONCE
Status: CANCELLED | OUTPATIENT
Start: 2024-02-22 | End: 2024-02-22

## 2024-02-22 RX ORDER — GABAPENTIN 600 MG/1
600 TABLET ORAL ONCE
Status: CANCELLED | OUTPATIENT
Start: 2024-02-22 | End: 2024-02-22

## 2024-02-22 RX ORDER — ACETAMINOPHEN 325 MG/1
975 TABLET ORAL ONCE
Status: CANCELLED | OUTPATIENT
Start: 2024-02-22 | End: 2024-02-22

## 2024-02-22 NOTE — PROGRESS NOTES
Please schedule Mirena IUD insertion under anesthesia due to extreme pain and anxiety with office insertion

## 2024-02-23 ENCOUNTER — TELEPHONE (OUTPATIENT)
Dept: OBSTETRICS AND GYNECOLOGY | Facility: CLINIC | Age: 24
End: 2024-02-23
Payer: COMMERCIAL

## 2024-02-23 LAB
BACTERIAL VAGINOSIS VAG-IMP: ABNORMAL
C TRACH RRNA SPEC QL NAA+PROBE: NEGATIVE
CLUE CELLS VAG LPF-#/AREA: ABNORMAL /[LPF]
N GONORRHOEA DNA SPEC QL PROBE+SIG AMP: NEGATIVE
NUGENT SCORE: 4
T VAGINALIS RRNA SPEC QL NAA+PROBE: NEGATIVE
YEAST VAG WET PREP-#/AREA: PRESENT

## 2024-02-23 NOTE — TELEPHONE ENCOUNTER
Attempted to call patient. Left message to return call to clarify. Patient information was sent to  to schedule IUD insertion under general anesthesia.

## 2024-02-23 NOTE — TELEPHONE ENCOUNTER
Patient called asking for a prescription for her injection. Was seen on 2/22      [facility]; [facility]   Physician:  Dr. Whitten  Procedure due:  Next available  Urgent/symptomatic:   []        Procedure: [x] Angiogram [] Carotid Stenting [] Coiling  [] SAC  [] Embo  [] Intracranial Stenting   [] IPSS  [] Flow Diverter   [] Wada  [] Angioplasty  [] Other     Consent form signed?               []                     Not yet                                                          _______________________________________________________________________________    PCP Clearance Prior [] No clearance required  Cardiac Clearance Prior []  Anesthesia Needed  []         Anesthesia Consult Prior []  Neuro monitoring?             []  Other Consult Prior  []   Other instructions:    __________________________________________________________________________________________    MD/ALLIE Appt (within 30d) [] No  Contrast allergy  [] No  Heparin allergy   [] No  Patient with CRF  [] No  Patient Diabetic  [] No  Anti-Coagulant  [] No  Bridging Ordered [] N/A  Anti-Platelet   [] No  DAPT 1-3 weeks prior [] No  P2Y12 / ASA Response [] No  Other meds to consider       [x]  Olmesartan

## 2024-02-26 PROBLEM — Z30.430 ENCOUNTER FOR INSERTION OF INTRAUTERINE CONTRACEPTIVE DEVICE (IUD): Status: ACTIVE | Noted: 2024-02-22

## 2024-02-26 PROBLEM — F41.1 GAD (GENERALIZED ANXIETY DISORDER): Status: ACTIVE | Noted: 2024-02-22

## 2024-02-27 ENCOUNTER — PREP FOR PROCEDURE (OUTPATIENT)
Dept: GASTROENTEROLOGY | Facility: CLINIC | Age: 24
End: 2024-02-27
Payer: COMMERCIAL

## 2024-02-27 RX ORDER — SODIUM CHLORIDE 9 MG/ML
20 INJECTION, SOLUTION INTRAVENOUS CONTINUOUS
Status: CANCELLED | OUTPATIENT
Start: 2024-02-27

## 2024-02-28 ENCOUNTER — LAB (OUTPATIENT)
Dept: LAB | Facility: LAB | Age: 24
End: 2024-02-28
Payer: COMMERCIAL

## 2024-02-28 ENCOUNTER — ANESTHESIA EVENT (OUTPATIENT)
Dept: GASTROENTEROLOGY | Facility: HOSPITAL | Age: 24
End: 2024-02-28
Payer: COMMERCIAL

## 2024-02-28 DIAGNOSIS — K62.89 RECTAL PAIN: ICD-10-CM

## 2024-02-28 DIAGNOSIS — R19.4 CHANGE IN BOWEL HABITS: ICD-10-CM

## 2024-02-28 DIAGNOSIS — R19.7 DIARRHEA, UNSPECIFIED TYPE: ICD-10-CM

## 2024-02-28 DIAGNOSIS — K52.9 COLITIS: ICD-10-CM

## 2024-02-28 PROCEDURE — 83993 ASSAY FOR CALPROTECTIN FECAL: CPT

## 2024-02-28 PROCEDURE — 82653 EL-1 FECAL QUANTITATIVE: CPT

## 2024-02-28 PROCEDURE — 87506 IADNA-DNA/RNA PROBE TQ 6-11: CPT

## 2024-02-29 ENCOUNTER — HOSPITAL ENCOUNTER (OUTPATIENT)
Dept: GASTROENTEROLOGY | Facility: HOSPITAL | Age: 24
Discharge: HOME | End: 2024-02-29
Payer: COMMERCIAL

## 2024-02-29 ENCOUNTER — ANESTHESIA (OUTPATIENT)
Dept: GASTROENTEROLOGY | Facility: HOSPITAL | Age: 24
End: 2024-02-29
Payer: COMMERCIAL

## 2024-02-29 VITALS
WEIGHT: 150 LBS | RESPIRATION RATE: 12 BRPM | DIASTOLIC BLOOD PRESSURE: 69 MMHG | BODY MASS INDEX: 26.58 KG/M2 | HEART RATE: 72 BPM | OXYGEN SATURATION: 98 % | SYSTOLIC BLOOD PRESSURE: 100 MMHG | TEMPERATURE: 97.4 F | HEIGHT: 63 IN

## 2024-02-29 DIAGNOSIS — K52.9 COLITIS: ICD-10-CM

## 2024-02-29 DIAGNOSIS — K21.9 CHRONIC GERD: ICD-10-CM

## 2024-02-29 LAB
C COLI+JEJ+UPSA DNA STL QL NAA+PROBE: NOT DETECTED
EC STX1 GENE STL QL NAA+PROBE: NOT DETECTED
EC STX2 GENE STL QL NAA+PROBE: NOT DETECTED
NOROVIRUS GI + GII RNA STL NAA+PROBE: NOT DETECTED
PREGNANCY TEST URINE, POC: NEGATIVE
RV RNA STL NAA+PROBE: NOT DETECTED
SALMONELLA DNA STL QL NAA+PROBE: NOT DETECTED
SHIGELLA DNA SPEC QL NAA+PROBE: NOT DETECTED
V CHOLERAE DNA STL QL NAA+PROBE: NOT DETECTED
Y ENTEROCOL DNA STL QL NAA+PROBE: NOT DETECTED

## 2024-02-29 PROCEDURE — 43239 EGD BIOPSY SINGLE/MULTIPLE: CPT | Performed by: INTERNAL MEDICINE

## 2024-02-29 PROCEDURE — 3700000002 HC GENERAL ANESTHESIA TIME - EACH INCREMENTAL 1 MINUTE

## 2024-02-29 PROCEDURE — 7100000010 HC PHASE TWO TIME - EACH INCREMENTAL 1 MINUTE

## 2024-02-29 PROCEDURE — 88305 TISSUE EXAM BY PATHOLOGIST: CPT | Performed by: STUDENT IN AN ORGANIZED HEALTH CARE EDUCATION/TRAINING PROGRAM

## 2024-02-29 PROCEDURE — 3700000001 HC GENERAL ANESTHESIA TIME - INITIAL BASE CHARGE

## 2024-02-29 PROCEDURE — 2500000005 HC RX 250 GENERAL PHARMACY W/O HCPCS: Performed by: NURSE ANESTHETIST, CERTIFIED REGISTERED

## 2024-02-29 PROCEDURE — 88305 TISSUE EXAM BY PATHOLOGIST: CPT | Mod: TC,PORLAB | Performed by: INTERNAL MEDICINE

## 2024-02-29 PROCEDURE — 2500000004 HC RX 250 GENERAL PHARMACY W/ HCPCS (ALT 636 FOR OP/ED): Performed by: INTERNAL MEDICINE

## 2024-02-29 PROCEDURE — 2500000004 HC RX 250 GENERAL PHARMACY W/ HCPCS (ALT 636 FOR OP/ED): Performed by: NURSE ANESTHETIST, CERTIFIED REGISTERED

## 2024-02-29 PROCEDURE — 7100000009 HC PHASE TWO TIME - INITIAL BASE CHARGE

## 2024-02-29 PROCEDURE — 45380 COLONOSCOPY AND BIOPSY: CPT | Performed by: INTERNAL MEDICINE

## 2024-02-29 RX ORDER — SODIUM CHLORIDE 9 MG/ML
20 INJECTION, SOLUTION INTRAVENOUS CONTINUOUS
Status: DISCONTINUED | OUTPATIENT
Start: 2024-02-29 | End: 2024-03-01 | Stop reason: HOSPADM

## 2024-02-29 RX ORDER — FENTANYL CITRATE 50 UG/ML
INJECTION, SOLUTION INTRAMUSCULAR; INTRAVENOUS AS NEEDED
Status: DISCONTINUED | OUTPATIENT
Start: 2024-02-29 | End: 2024-02-29

## 2024-02-29 RX ORDER — LIDOCAINE HYDROCHLORIDE 20 MG/ML
INJECTION, SOLUTION EPIDURAL; INFILTRATION; INTRACAUDAL; PERINEURAL AS NEEDED
Status: DISCONTINUED | OUTPATIENT
Start: 2024-02-29 | End: 2024-02-29

## 2024-02-29 RX ORDER — PROPOFOL 10 MG/ML
INJECTION, EMULSION INTRAVENOUS AS NEEDED
Status: DISCONTINUED | OUTPATIENT
Start: 2024-02-29 | End: 2024-02-29

## 2024-02-29 RX ADMIN — PROPOFOL 50 MG: 10 INJECTION, EMULSION INTRAVENOUS at 11:27

## 2024-02-29 RX ADMIN — LIDOCAINE HYDROCHLORIDE 40 MG: 20 INJECTION, SOLUTION EPIDURAL; INFILTRATION; INTRACAUDAL; PERINEURAL at 11:15

## 2024-02-29 RX ADMIN — PROPOFOL 50 MG: 10 INJECTION, EMULSION INTRAVENOUS at 11:31

## 2024-02-29 RX ADMIN — PROPOFOL 100 MG: 10 INJECTION, EMULSION INTRAVENOUS at 11:15

## 2024-02-29 RX ADMIN — FENTANYL CITRATE 25 MCG: 50 INJECTION INTRAMUSCULAR; INTRAVENOUS at 11:18

## 2024-02-29 RX ADMIN — PROPOFOL 50 MG: 10 INJECTION, EMULSION INTRAVENOUS at 11:36

## 2024-02-29 RX ADMIN — SODIUM CHLORIDE 20 ML/HR: 9 INJECTION, SOLUTION INTRAVENOUS at 10:45

## 2024-02-29 RX ADMIN — FENTANYL CITRATE 50 MCG: 50 INJECTION INTRAMUSCULAR; INTRAVENOUS at 11:15

## 2024-02-29 RX ADMIN — PROPOFOL 50 MG: 10 INJECTION, EMULSION INTRAVENOUS at 11:21

## 2024-02-29 RX ADMIN — PROPOFOL 50 MG: 10 INJECTION, EMULSION INTRAVENOUS at 11:18

## 2024-02-29 RX ADMIN — FENTANYL CITRATE 25 MCG: 50 INJECTION INTRAMUSCULAR; INTRAVENOUS at 11:23

## 2024-02-29 RX ADMIN — PROPOFOL 50 MG: 10 INJECTION, EMULSION INTRAVENOUS at 11:23

## 2024-02-29 SDOH — HEALTH STABILITY: MENTAL HEALTH: CURRENT SMOKER: 1

## 2024-02-29 ASSESSMENT — PAIN - FUNCTIONAL ASSESSMENT: PAIN_FUNCTIONAL_ASSESSMENT: 0-10

## 2024-02-29 ASSESSMENT — COLUMBIA-SUICIDE SEVERITY RATING SCALE - C-SSRS
1. IN THE PAST MONTH, HAVE YOU WISHED YOU WERE DEAD OR WISHED YOU COULD GO TO SLEEP AND NOT WAKE UP?: NO
6. HAVE YOU EVER DONE ANYTHING, STARTED TO DO ANYTHING, OR PREPARED TO DO ANYTHING TO END YOUR LIFE?: NO
2. HAVE YOU ACTUALLY HAD ANY THOUGHTS OF KILLING YOURSELF?: NO

## 2024-02-29 ASSESSMENT — PAIN SCALES - GENERAL
PAINLEVEL_OUTOF10: 0 - NO PAIN
PAIN_LEVEL: 0
PAINLEVEL_OUTOF10: 0 - NO PAIN

## 2024-02-29 NOTE — ANESTHESIA POSTPROCEDURE EVALUATION
Patient: Melanie Lance    Procedure Summary       Date: 02/29/24 Room / Location: [facility]    Anesthesia Start: 1109 Anesthesia Stop: 1147    Procedures:       COLONOSCOPY      EGD Diagnosis:       Colitis      Chronic GERD    Scheduled Providers: Sergey Bailey MD Responsible Provider: HAYDER Michaud    Anesthesia Type: spinal ASA Status: 2            Anesthesia Type: spinal    Vitals Value Taken Time   BP 90/60 02/29/24 1148   Temp 36.5 °C (97.7 °F) 02/29/24 1148   Pulse 63 02/29/24 1148   Resp 16 02/29/24 1148   SpO2 98 % 02/29/24 1148       Anesthesia Post Evaluation    Patient location during evaluation: bedside  Patient participation: complete - patient participated  Level of consciousness: awake and alert  Pain score: 0  Pain management: adequate  Airway patency: patent  Cardiovascular status: acceptable  Respiratory status: acceptable  Hydration status: acceptable  Postoperative Nausea and Vomiting: none    There were no known notable events for this encounter.

## 2024-02-29 NOTE — ANESTHESIA PREPROCEDURE EVALUATION
Patient: Melanie Lance    Procedure Information       Date/Time: 02/29/24 1100    Scheduled providers: Sergey Bailey MD    Procedures:       COLONOSCOPY      EGD    Location: [facility]            Relevant Problems   Anesthesia (within normal limits)      Cardiovascular (within normal limits)      Endocrine (within normal limits)      GI   (+) Chronic GERD   (+) GI bleeding   (+) Lower GI bleed      /Renal (within normal limits)      Neuro/Psych   (+) MITESH (generalized anxiety disorder)   (+) Generalized anxiety disorder   (+) Moderate episode of recurrent major depressive disorder (CMS/HCC)   (+) PTSD (post-traumatic stress disorder)      Pulmonary (within normal limits)      GI/Hepatic (within normal limits)      Hematology   (+) Blood loss anemia      Eyes, Ears, Nose, and Throat (within normal limits)       Clinical information reviewed:   Tobacco  Allergies  Meds   Med Hx  Surg Hx   Fam Hx  Soc Hx        NPO Detail:  NPO/Void Status  Date of Last Liquid: 02/29/24  Time of Last Liquid: 0600  Date of Last Solid: 02/27/24  Time of Last Solid: 2200  Last Intake Type: Clear fluids; GI prep         Physical Exam    Airway  Mallampati: II  TM distance: >3 FB  Neck ROM: full     Cardiovascular - normal exam  Rhythm: regular     Dental - normal exam     Pulmonary - normal exam     Abdominal - normal exam         Anesthesia Plan    History of general anesthesia?: yes  History of complications of general anesthesia?: no    ASA 2     spinal     The patient is a current smoker.  Patient was previously instructed to abstain from smoking on day of procedure.  Patient smoked on day of procedure.    intravenous induction   Anesthetic plan and risks discussed with patient.

## 2024-03-01 LAB — CALPROTECTIN STL-MCNT: 40 UG/G

## 2024-03-01 NOTE — ADDENDUM NOTE
Encounter addended by: Chichi Wall RN on: 3/1/2024 9:56 AM   Actions taken: Contacts section saved, Flowsheet accepted

## 2024-03-02 LAB — ELASTASE PANC STL-MCNT: >800 UG/G

## 2024-03-06 ENCOUNTER — PATIENT OUTREACH (OUTPATIENT)
Dept: PRIMARY CARE | Facility: CLINIC | Age: 24
End: 2024-03-06
Payer: COMMERCIAL

## 2024-03-06 NOTE — PROGRESS NOTES
Unable to reach patient for one month post discharge follow up call.   LVM with call back number for patient to call if needed   If no voicemail available call attempts x 2 were made to contact the patient to assist with any questions or concerns patient may have.     Umair Mendoza LPN

## 2024-03-08 LAB
LABORATORY COMMENT REPORT: NORMAL
PATH REPORT.FINAL DX SPEC: NORMAL
PATH REPORT.GROSS SPEC: NORMAL
PATH REPORT.RELEVANT HX SPEC: NORMAL
PATH REPORT.TOTAL CANCER: NORMAL

## 2024-03-21 ENCOUNTER — ANESTHESIA EVENT (OUTPATIENT)
Dept: OPERATING ROOM | Facility: HOSPITAL | Age: 24
End: 2024-03-21
Payer: COMMERCIAL

## 2024-03-21 RX ORDER — ONDANSETRON HYDROCHLORIDE 2 MG/ML
4 INJECTION, SOLUTION INTRAVENOUS ONCE AS NEEDED
Status: CANCELLED | OUTPATIENT
Start: 2024-03-21

## 2024-03-22 NOTE — PROGRESS NOTES
Melanie Lance is a 24 y.o. female who is here for a routine exam. And preop visit. Scheduled for IUD insertion under anesthesia on 3/27/24   PCP = Kamar Cooper MD    APE Concerns: none    Other Concerns: ***  Preventative:  Seat Belt Use: always    GynHx:  Periods are {gyn period regularity:715}, lasting {numbers; 0-10:87874} days.  Dysmenorrhea: {gyn dysmenorrhea:716}.   Cyclic symptoms include {sys gyn cyclic sx:90479}.    Social History     Substance and Sexual Activity   Sexual Activity Yes    Partners: Male     Current contraception: None  STIs: none  Last PAP:       SoHx:  Substance:   Tobacco Use: Low Risk  (3/21/2024)    Patient History     Smoking Tobacco Use: Never     Smokeless Tobacco Use: Never     Passive Exposure: Never      Social History     Substance and Sexual Activity   Drug Use Never      Social History     Substance and Sexual Activity   Alcohol Use Yes    Comment: occasional       Past Medical History:   Diagnosis Date    Depression     Gestational hypertension 10/13/2023    Personal history of other diseases of the respiratory system 01/02/2015    History of acute bacterial sinusitis    PTSD (post-traumatic stress disorder)       Past Surgical History:   Procedure Laterality Date    COLONOSCOPY W/ BIOPSIES  02/01/2024    EYE SURGERY      TYMPANOSTOMY TUBE PLACEMENT Bilateral     UPPER GASTROINTESTINAL ENDOSCOPY        Objective   There were no vitals taken for this visit.     General:   Alert and oriented, in no acute distress   Neck: Supple. No visible thyromegaly.    Breast/Axilla: Normal to palpation bilaterally without masses, skin changes, or nipple discharge.    Abdomen: Soft, non-tender, without masses or organomegaly   Vulva: Normal architecture without erythema, masses, or lesions.    Vagina: Normal mucosa without lesions, masses, or atrophy. No abnormal vaginal discharge.    Cervix: Normal without masses, lesions, or signs of cervicitis.    Uterus: Normal mobile, non-enlarged  uterus    Adnexa: Normal without masses or lesions   Pelvic Floor No POP noted. No high tone pelvic floor    Psych Normal affect. Normal mood.      Problem List Items Addressed This Visit    None     Assessment/Plan    Thank you for coming to your annual exam. Your findings during the exam were normal. Specific topics addressed during this exam included: healthy lifestyle, well woman screening guidelines,  Healthy diet with high fiber, Weight bearing exercise 3 to 5 times a week, Multivitamins and calcium ***    Actions performed during this visit include:  - Clinical breast exam  - Clinical pelvic exam  - No orders of the defined types were placed in this encounter.       Please return for your next visit in 1 year.

## 2024-03-25 ENCOUNTER — APPOINTMENT (OUTPATIENT)
Dept: OBSTETRICS AND GYNECOLOGY | Facility: CLINIC | Age: 24
End: 2024-03-25
Payer: COMMERCIAL

## 2024-03-27 ENCOUNTER — ANESTHESIA (OUTPATIENT)
Dept: OPERATING ROOM | Facility: HOSPITAL | Age: 24
End: 2024-03-27
Payer: COMMERCIAL

## 2024-03-27 ENCOUNTER — HOSPITAL ENCOUNTER (OUTPATIENT)
Facility: HOSPITAL | Age: 24
Setting detail: OUTPATIENT SURGERY
Discharge: HOME | End: 2024-03-27
Attending: OBSTETRICS & GYNECOLOGY | Admitting: OBSTETRICS & GYNECOLOGY
Payer: COMMERCIAL

## 2024-03-27 VITALS
OXYGEN SATURATION: 99 % | HEART RATE: 72 BPM | BODY MASS INDEX: 26.58 KG/M2 | WEIGHT: 150 LBS | HEIGHT: 63 IN | DIASTOLIC BLOOD PRESSURE: 70 MMHG | RESPIRATION RATE: 14 BRPM | TEMPERATURE: 97.1 F | SYSTOLIC BLOOD PRESSURE: 100 MMHG

## 2024-03-27 DIAGNOSIS — Z30.430 ENCOUNTER FOR INSERTION OF INTRAUTERINE CONTRACEPTIVE DEVICE (IUD): Primary | ICD-10-CM

## 2024-03-27 DIAGNOSIS — F41.1 GAD (GENERALIZED ANXIETY DISORDER): ICD-10-CM

## 2024-03-27 LAB — PREGNANCY TEST URINE, POC: NEGATIVE

## 2024-03-27 PROCEDURE — 58300 INSERT INTRAUTERINE DEVICE: CPT | Performed by: OBSTETRICS & GYNECOLOGY

## 2024-03-27 PROCEDURE — 2500000004 HC RX 250 GENERAL PHARMACY W/ HCPCS (ALT 636 FOR OP/ED): Performed by: ANESTHESIOLOGY

## 2024-03-27 PROCEDURE — 81025 URINE PREGNANCY TEST: CPT | Performed by: OBSTETRICS & GYNECOLOGY

## 2024-03-27 PROCEDURE — 3700000002 HC GENERAL ANESTHESIA TIME - EACH INCREMENTAL 1 MINUTE: Performed by: OBSTETRICS & GYNECOLOGY

## 2024-03-27 PROCEDURE — 2500000001 HC RX 250 WO HCPCS SELF ADMINISTERED DRUGS (ALT 637 FOR MEDICARE OP): Performed by: ANESTHESIOLOGY

## 2024-03-27 PROCEDURE — 3600000002 HC OR TIME - INITIAL BASE CHARGE - PROCEDURE LEVEL TWO: Performed by: OBSTETRICS & GYNECOLOGY

## 2024-03-27 PROCEDURE — 7100000001 HC RECOVERY ROOM TIME - INITIAL BASE CHARGE: Performed by: OBSTETRICS & GYNECOLOGY

## 2024-03-27 PROCEDURE — 2500000001 HC RX 250 WO HCPCS SELF ADMINISTERED DRUGS (ALT 637 FOR MEDICARE OP): Performed by: OBSTETRICS & GYNECOLOGY

## 2024-03-27 PROCEDURE — 3600000007 HC OR TIME - EACH INCREMENTAL 1 MINUTE - PROCEDURE LEVEL TWO: Performed by: OBSTETRICS & GYNECOLOGY

## 2024-03-27 PROCEDURE — 3700000001 HC GENERAL ANESTHESIA TIME - INITIAL BASE CHARGE: Performed by: OBSTETRICS & GYNECOLOGY

## 2024-03-27 PROCEDURE — 7100000009 HC PHASE TWO TIME - INITIAL BASE CHARGE: Performed by: OBSTETRICS & GYNECOLOGY

## 2024-03-27 PROCEDURE — 6360000003 HC OR 636 NO HCPCS: Performed by: OBSTETRICS & GYNECOLOGY

## 2024-03-27 PROCEDURE — 7100000002 HC RECOVERY ROOM TIME - EACH INCREMENTAL 1 MINUTE: Performed by: OBSTETRICS & GYNECOLOGY

## 2024-03-27 PROCEDURE — 2500000004 HC RX 250 GENERAL PHARMACY W/ HCPCS (ALT 636 FOR OP/ED): Performed by: NURSE ANESTHETIST, CERTIFIED REGISTERED

## 2024-03-27 PROCEDURE — 7100000010 HC PHASE TWO TIME - EACH INCREMENTAL 1 MINUTE: Performed by: OBSTETRICS & GYNECOLOGY

## 2024-03-27 DEVICE — SYSTEM, INTRAUTERINE MIRENA: Type: IMPLANTABLE DEVICE | Site: UTERUS | Status: FUNCTIONAL

## 2024-03-27 RX ORDER — FENTANYL CITRATE 50 UG/ML
INJECTION, SOLUTION INTRAMUSCULAR; INTRAVENOUS AS NEEDED
Status: DISCONTINUED | OUTPATIENT
Start: 2024-03-27 | End: 2024-03-27

## 2024-03-27 RX ORDER — ONDANSETRON HYDROCHLORIDE 2 MG/ML
4 INJECTION, SOLUTION INTRAVENOUS ONCE
Status: COMPLETED | OUTPATIENT
Start: 2024-03-27 | End: 2024-03-27

## 2024-03-27 RX ORDER — MIDAZOLAM HYDROCHLORIDE 1 MG/ML
INJECTION, SOLUTION INTRAMUSCULAR; INTRAVENOUS AS NEEDED
Status: DISCONTINUED | OUTPATIENT
Start: 2024-03-27 | End: 2024-03-27

## 2024-03-27 RX ORDER — CELECOXIB 200 MG/1
400 CAPSULE ORAL ONCE
Status: COMPLETED | OUTPATIENT
Start: 2024-03-27 | End: 2024-03-27

## 2024-03-27 RX ORDER — GABAPENTIN 300 MG/1
600 CAPSULE ORAL ONCE
Status: COMPLETED | OUTPATIENT
Start: 2024-03-27 | End: 2024-03-27

## 2024-03-27 RX ORDER — SODIUM CHLORIDE, SODIUM LACTATE, POTASSIUM CHLORIDE, CALCIUM CHLORIDE 600; 310; 30; 20 MG/100ML; MG/100ML; MG/100ML; MG/100ML
50 INJECTION, SOLUTION INTRAVENOUS CONTINUOUS
Status: DISCONTINUED | OUTPATIENT
Start: 2024-03-27 | End: 2024-03-27 | Stop reason: HOSPADM

## 2024-03-27 RX ORDER — ACETAMINOPHEN 325 MG/1
975 TABLET ORAL ONCE
Status: COMPLETED | OUTPATIENT
Start: 2024-03-27 | End: 2024-03-27

## 2024-03-27 RX ORDER — HYDROXYZINE HYDROCHLORIDE 25 MG/1
25 TABLET, FILM COATED ORAL EVERY 8 HOURS PRN
COMMUNITY

## 2024-03-27 RX ORDER — FAMOTIDINE 10 MG/ML
20 INJECTION INTRAVENOUS ONCE
Status: COMPLETED | OUTPATIENT
Start: 2024-03-27 | End: 2024-03-27

## 2024-03-27 RX ORDER — PROPOFOL 10 MG/ML
INJECTION, EMULSION INTRAVENOUS AS NEEDED
Status: DISCONTINUED | OUTPATIENT
Start: 2024-03-27 | End: 2024-03-27

## 2024-03-27 RX ORDER — SODIUM CITRATE AND CITRIC ACID MONOHYDRATE 334; 500 MG/5ML; MG/5ML
30 SOLUTION ORAL ONCE
Status: COMPLETED | OUTPATIENT
Start: 2024-03-27 | End: 2024-03-27

## 2024-03-27 RX ORDER — METOCLOPRAMIDE HYDROCHLORIDE 5 MG/ML
10 INJECTION INTRAMUSCULAR; INTRAVENOUS ONCE
Status: COMPLETED | OUTPATIENT
Start: 2024-03-27 | End: 2024-03-27

## 2024-03-27 RX ADMIN — FENTANYL CITRATE 100 MCG: 50 INJECTION INTRAMUSCULAR; INTRAVENOUS at 12:44

## 2024-03-27 RX ADMIN — GABAPENTIN 600 MG: 300 CAPSULE ORAL at 11:48

## 2024-03-27 RX ADMIN — SODIUM CITRATE AND CITRIC ACID MONOHYDRATE 30 ML: 500; 334 SOLUTION ORAL at 11:48

## 2024-03-27 RX ADMIN — DEXAMETHASONE SODIUM PHOSPHATE 8 MG: 4 INJECTION, SOLUTION INTRAMUSCULAR; INTRAVENOUS at 11:46

## 2024-03-27 RX ADMIN — MIDAZOLAM 2 MG: 1 INJECTION INTRAMUSCULAR; INTRAVENOUS at 12:44

## 2024-03-27 RX ADMIN — CELECOXIB 400 MG: 200 CAPSULE ORAL at 11:48

## 2024-03-27 RX ADMIN — FAMOTIDINE 20 MG: 10 INJECTION, SOLUTION INTRAVENOUS at 11:47

## 2024-03-27 RX ADMIN — METOCLOPRAMIDE 10 MG: 5 INJECTION, SOLUTION INTRAMUSCULAR; INTRAVENOUS at 11:47

## 2024-03-27 RX ADMIN — PROPOFOL 200 MG: 10 INJECTION, EMULSION INTRAVENOUS at 12:44

## 2024-03-27 RX ADMIN — ACETAMINOPHEN 975 MG: 325 TABLET ORAL at 11:48

## 2024-03-27 RX ADMIN — SODIUM CHLORIDE, POTASSIUM CHLORIDE, SODIUM LACTATE AND CALCIUM CHLORIDE 50 ML/HR: 600; 310; 30; 20 INJECTION, SOLUTION INTRAVENOUS at 11:48

## 2024-03-27 RX ADMIN — ONDANSETRON 4 MG: 2 INJECTION INTRAMUSCULAR; INTRAVENOUS at 11:47

## 2024-03-27 RX ADMIN — SODIUM CHLORIDE, POTASSIUM CHLORIDE, SODIUM LACTATE AND CALCIUM CHLORIDE: 600; 310; 30; 20 INJECTION, SOLUTION INTRAVENOUS at 12:37

## 2024-03-27 SDOH — HEALTH STABILITY: MENTAL HEALTH: CURRENT SMOKER: 0

## 2024-03-27 ASSESSMENT — ENCOUNTER SYMPTOMS
NEUROLOGICAL NEGATIVE: 1
ALLERGIC/IMMUNOLOGIC NEGATIVE: 1
ENDOCRINE NEGATIVE: 1
NERVOUS/ANXIOUS: 1
EYES NEGATIVE: 1
CONSTITUTIONAL NEGATIVE: 1
MUSCULOSKELETAL NEGATIVE: 1
HEMATOLOGIC/LYMPHATIC NEGATIVE: 1
RESPIRATORY NEGATIVE: 1
GASTROINTESTINAL NEGATIVE: 1
CARDIOVASCULAR NEGATIVE: 1

## 2024-03-27 ASSESSMENT — COLUMBIA-SUICIDE SEVERITY RATING SCALE - C-SSRS
1. IN THE PAST MONTH, HAVE YOU WISHED YOU WERE DEAD OR WISHED YOU COULD GO TO SLEEP AND NOT WAKE UP?: NO
2. HAVE YOU ACTUALLY HAD ANY THOUGHTS OF KILLING YOURSELF?: NO
6. HAVE YOU EVER DONE ANYTHING, STARTED TO DO ANYTHING, OR PREPARED TO DO ANYTHING TO END YOUR LIFE?: NO

## 2024-03-27 ASSESSMENT — PAIN - FUNCTIONAL ASSESSMENT: PAIN_FUNCTIONAL_ASSESSMENT: 0-10

## 2024-03-27 ASSESSMENT — PAIN SCALES - GENERAL
PAIN_LEVEL: 0
PAINLEVEL_OUTOF10: 0 - NO PAIN

## 2024-03-27 NOTE — OP NOTE
IUD insertion under anesthesia Operative Note     Date: 3/27/2024  OR Location: [facility]    Name: Melanie Lance, : 2000, Age: 24 y.o., MRN: [id number], Sex: female    Diagnosis  Pre-op Diagnosis     * Encounter for insertion of intrauterine contraceptive device (IUD) [Z30.430]     * MITESH (generalized anxiety disorder) [F41.1] Post-op Diagnosis     * Encounter for insertion of intrauterine contraceptive device (IUD) [Z30.430]     * MITESH (generalized anxiety disorder) [F41.1]     Procedures  IUD insertion under anesthesia  65030 - [facility] INSERTION INTRAUTERINE DEVICE IUD      Surgeons      * Clara Mayo - Primary    Resident/Fellow/Other Assistant:  Surgeon(s) and Role: None    Procedure Summary  Anesthesia: General  ASA: II  Anesthesia Staff: CRNA: ARTURO Ramos-CRNA  Estimated Blood Loss: 0 mL  Intra-op Medications: Administrations occurring from 1300 to 1400 on 24:  * No intraprocedure medications in log *        Specimen: No specimens collected     Staff:   Circulator: Sue Moss RN  Scrub Person: Arielle Bradley         Drains and/or Catheters: * None in log *    Tourniquet Times:         Implants:     Findings: Normal appearing vaginal walls and cervix. Uterine cavity measured 7 cm    Indications: Melanie Lance is an 24 y.o. female who is having surgery for Encounter for insertion of intrauterine contraceptive device (IUD) [Z30.430]  MITESH (generalized anxiety disorder) [F41.1].     The patient was seen in the preoperative area. The risks, benefits, complications, treatment options, non-operative alternatives, expected recovery and outcomes were discussed with the patient. The possibilities of reaction to medication, pulmonary aspiration, injury to surrounding structures, bleeding, recurrent infection, the need for additional procedures, failure to diagnose a condition, and creating a complication requiring transfusion or operation were discussed with the patient. The patient concurred with the  proposed plan, giving informed consent.  The site of surgery was properly noted/marked if necessary per policy. The patient has been actively warmed in preoperative area. Preoperative antibiotics are not indicated. Venous thrombosis prophylaxis have been ordered including bilateral sequential compression devices    Procedure Details: Patient was brought into the OR, in OR [place] was completed.  After anesthesia was found to be adequate she was placed in dorsolithotomy position and prepped and draped in the usual sterile fashion.  Bladder was evacuated with straight catheter.  Next a weighted posterior vaginal speculum was inserted and anterior lip of the cervix was grasped with single-tooth tenaculum.  Uterus was sounded to 7 cm.  The Mirena IUD was set to appropriate cavity length and inserted without complications.  The string was then trimmed to 2 cm.  Tenaculum was removed and weighted posterior vaginal speculum was removed.  Procedure was terminated without complications  Complications:  None; patient tolerated the procedure well.    Disposition: PACU - hemodynamically stable.  Condition: stable         Additional Details:     Attending Attestation: I performed the procedure.    Clara Mayo  Phone Number: [phone]

## 2024-03-27 NOTE — H&P
History Of Present Illness  Melanie Lance is a 24 y.o. female presenting for Mirena IUD insertion under sedation.  She has severe anxiety/depression disorder and PTSD,  and is unable to tolerate office IUD insertion.     Past Medical History  Past Medical History:   Diagnosis Date    Depression     Gestational hypertension 10/13/2023    Personal history of other diseases of the respiratory system 01/02/2015    History of acute bacterial sinusitis    PTSD (post-traumatic stress disorder)        Surgical History  Past Surgical History:   Procedure Laterality Date    COLONOSCOPY W/ BIOPSIES  02/01/2024    EYE SURGERY      TYMPANOSTOMY TUBE PLACEMENT Bilateral     UPPER GASTROINTESTINAL ENDOSCOPY          Social History  She reports that she has never smoked. She has never been exposed to tobacco smoke. She has never used smokeless tobacco. She reports current alcohol use. She reports that she does not use drugs.    Family History  Family History   Problem Relation Name Age of Onset    Testicular cancer Father      Other (mental disorder) Father      Hypertension Father      Other (small cell lung cancer) Maternal Grandmother      Breast cancer Maternal Grandmother      Other (Cardiac disorder) Maternal Grandfather      Other (Cardiac disorder) Paternal Grandmother      Other (Cardiac disorder) Paternal Grandfather      Diabetes Paternal Grandfather          Allergies  Amoxicillin, Azithromycin, Cefdinir, and Miconazole    Review of Systems   Constitutional: Negative.    HENT: Negative.     Eyes: Negative.    Respiratory: Negative.     Cardiovascular: Negative.    Gastrointestinal: Negative.    Endocrine: Negative.    Genitourinary: Negative.    Musculoskeletal: Negative.    Allergic/Immunologic: Negative.    Neurological: Negative.    Hematological: Negative.    Psychiatric/Behavioral:  The patient is nervous/anxious.         Physical Exam  Constitutional:       Appearance: Normal appearance.   HENT:      Head:  "Normocephalic.      Nose: Nose normal.      Mouth/Throat:      Mouth: Mucous membranes are moist.   Eyes:      Pupils: Pupils are equal, round, and reactive to light.   Cardiovascular:      Rate and Rhythm: Normal rate and regular rhythm.      Pulses: Normal pulses.      Heart sounds: Normal heart sounds.   Pulmonary:      Effort: Pulmonary effort is normal.      Breath sounds: Normal breath sounds.   Abdominal:      General: Abdomen is flat.   Musculoskeletal:         General: Normal range of motion.      Cervical back: Normal range of motion and neck supple.   Skin:     General: Skin is warm.   Neurological:      General: No focal deficit present.      Mental Status: She is alert and oriented to person, place, and time.   Psychiatric:         Mood and Affect: Mood normal.         Behavior: Behavior normal.         Thought Content: Thought content normal.         Judgment: Judgment normal.          Last Recorded Vitals  Blood pressure 117/74, pulse 77, temperature 36.9 °C (98.4 °F), temperature source Temporal, resp. rate 20, height 1.6 m (5' 3\"), weight 68 kg (150 lb), SpO2 97 %, not currently breastfeeding.    Relevant Results             Assessment/Plan   Principal Problem:    Encounter for insertion of intrauterine contraceptive device (IUD)  Active Problems:    MITESH (generalized anxiety disorder)             I spent 10 minutes in the professional and overall care of this patient.      Clara Mayo MD    "

## 2024-03-27 NOTE — ANESTHESIA PREPROCEDURE EVALUATION
Patient: Melanie Lance    Procedure Information       Date/Time: 03/27/24 1300    Procedure: IUD insertion under anesthesia (Uterus) - IUD insertion under anesthesia    Location: [facility] 02 / [facility]    Surgeons: Clara Mayo MD            Relevant Problems   Neuro   (+) MITESH (generalized anxiety disorder)   (+) Generalized anxiety disorder   (+) Moderate episode of recurrent major depressive disorder (CMS/HCC)   (+) PTSD (post-traumatic stress disorder)      GI   (+) Chronic GERD   (+) GI bleeding   (+) Lower GI bleed      Hematology   (+) Blood loss anemia      Skin   (+) Butterfly rash       Clinical information reviewed:    Allergies  Meds  Problems  Med Hx             NPO Detail:  NPO/Void Status  Date of Last Liquid: 03/26/24  Time of Last Liquid: 2330  Date of Last Solid: 03/26/24  Time of Last Solid: 2330         Physical Exam    Airway  Mallampati: II  TM distance: >3 FB  Neck ROM: full     Cardiovascular - normal exam     Dental - normal exam     Pulmonary - normal exam     Abdominal - normal exam             Anesthesia Plan    History of general anesthesia?: yes  History of complications of general anesthesia?: no    ASA 2     MAC     The patient is not a current smoker.    intravenous induction   Anesthetic plan and risks discussed with patient.  Use of blood products discussed with patient who consented to blood products.    Plan discussed with CRNA.

## 2024-03-27 NOTE — ANESTHESIA POSTPROCEDURE EVALUATION
Patient: Melanie Lance    Procedure Summary       Date: 03/27/24 Room / Location: [facility] / [facility]    Anesthesia Start: 1237 Anesthesia Stop: 1304    Procedure: IUD insertion under anesthesia (Uterus) Diagnosis:       Encounter for insertion of intrauterine contraceptive device (IUD)      MITESH (generalized anxiety disorder)      (Encounter for insertion of intrauterine contraceptive device (IUD) [Z30.430])      (MITESH (generalized anxiety disorder) [F41.1])    Surgeons: Clara Mayo MD Responsible Provider: HAYDER Ramos    Anesthesia Type: general ASA Status: 2            Anesthesia Type: general    Vitals Value Taken Time   /64 03/27/24 1331   Temp 36.1 °C (97 °F) 03/27/24 1258   Pulse 61 03/27/24 1338   Resp 16 03/27/24 1338   SpO2 100 % 03/27/24 1338   Vitals shown include unvalidated device data.    Anesthesia Post Evaluation    Patient location during evaluation: PACU  Patient participation: complete - patient participated  Level of consciousness: awake and alert  Pain score: 0  Pain management: adequate  Airway patency: patent  Cardiovascular status: acceptable  Respiratory status: room air  Hydration status: acceptable  Postoperative Nausea and Vomiting: none    No notable events documented.

## 2024-03-27 NOTE — DISCHARGE INSTRUCTIONS
Pelvic rest for 72 hours: no intercourse, tampons or douching  May take OTC tylenol or ibuprofen for cramping.  It is normal to notice cramping and period like vaginal bleeding after IUD insertion, this should resolve in a few days to weeks

## 2024-04-09 ENCOUNTER — APPOINTMENT (OUTPATIENT)
Dept: PRIMARY CARE | Facility: CLINIC | Age: 24
End: 2024-04-09
Payer: COMMERCIAL

## 2024-04-11 ENCOUNTER — APPOINTMENT (OUTPATIENT)
Dept: OBSTETRICS AND GYNECOLOGY | Facility: CLINIC | Age: 24
End: 2024-04-11
Payer: COMMERCIAL

## 2024-05-07 ENCOUNTER — PATIENT OUTREACH (OUTPATIENT)
Dept: PRIMARY CARE | Facility: CLINIC | Age: 24
End: 2024-05-07
Payer: COMMERCIAL

## 2024-05-07 NOTE — PROGRESS NOTES
Patient has met target of no readmission for (90) days post (hospital, SNF, rehab) discharge and is graduated from Transitional Care Management program at this time.    Umair Mendoza LPN

## 2024-08-09 DIAGNOSIS — G56.03 BILATERAL CARPAL TUNNEL SYNDROME: ICD-10-CM

## 2024-08-14 ENCOUNTER — HOSPITAL ENCOUNTER (OUTPATIENT)
Dept: RADIOLOGY | Facility: CLINIC | Age: 24
Discharge: HOME | End: 2024-08-14
Payer: COMMERCIAL

## 2024-08-14 ENCOUNTER — APPOINTMENT (OUTPATIENT)
Dept: ORTHOPEDIC SURGERY | Facility: CLINIC | Age: 24
End: 2024-08-14
Payer: COMMERCIAL

## 2024-08-14 VITALS — HEIGHT: 63 IN | WEIGHT: 150 LBS | BODY MASS INDEX: 26.58 KG/M2

## 2024-08-14 DIAGNOSIS — G56.03 BILATERAL CARPAL TUNNEL SYNDROME: ICD-10-CM

## 2024-08-14 DIAGNOSIS — G56.23 CUBITAL TUNNEL SYNDROME, BILATERAL: ICD-10-CM

## 2024-08-14 DIAGNOSIS — G56.03 BILATERAL CARPAL TUNNEL SYNDROME: Primary | ICD-10-CM

## 2024-08-14 PROCEDURE — 3008F BODY MASS INDEX DOCD: CPT | Performed by: ORTHOPAEDIC SURGERY

## 2024-08-14 PROCEDURE — 73130 X-RAY EXAM OF HAND: CPT | Mod: 50

## 2024-08-14 PROCEDURE — 99204 OFFICE O/P NEW MOD 45 MIN: CPT | Performed by: ORTHOPAEDIC SURGERY

## 2024-08-14 PROCEDURE — L3908 WHO COCK-UP NONMOLDE PRE OTS: HCPCS | Performed by: ORTHOPAEDIC SURGERY

## 2024-08-14 ASSESSMENT — PAIN SCALES - GENERAL: PAINLEVEL_OUTOF10: 3

## 2024-08-14 ASSESSMENT — PAIN - FUNCTIONAL ASSESSMENT: PAIN_FUNCTIONAL_ASSESSMENT: 0-10

## 2024-08-14 ASSESSMENT — PAIN DESCRIPTION - DESCRIPTORS: DESCRIPTORS: ACHING

## 2024-08-14 NOTE — PROGRESS NOTES
24 y.o. female presents today for evaluation of bilateral hand numbness, tingling, weakness and pain. The patient reports symptoms for months, getting worse.  Pain is controlled.  Reports no previous surgeries, injections or trauma to the area.  Reports pain worse with use, better at rest.  Pain numb and tingly, wakes them from sleep.   Worse driving a car and talking on a phone.   Right worse than left.  States this started when she was pregnant and then went away but then recently over the past few months has returned and gotten worse.    Review of Systems    Constitutional: see HPI, no fever, no chills, not feeling tired, no significant weight gain or weight loss.   Eyes: No vision changes  ENT: no nosebleeds.   Cardiovascular: no chest pain.   Respiratory: no shortness of breath and no cough.   Gastrointestinal: no abdominal pain, no nausea, no vomiting and no diarrhea.   Musculoskeletal: per HPI  Neurological: no headache, no gait disturbances  Psychiatric: no depression and no sleep disturbances.   Endocrine: no muscle weakness and no muscle cramps.   Hematologic/Lymphatic: no swollen glands and no tendency for easy bruising or excessive swelling.     Patient's past medical history, past surgical history, allergies, and medications have been reviewed unless otherwise noted in the chart.     Carpal Tunnel Exam  Inspection:  no evidence of infection, no edema, no erythema, no ecchymosis, Palpation:  compartments are soft, no pain with palpation, Range of Motion:  full wrist and finger range of motion, Stability:  no wrist instability detected, Strength:  5/5 APB and intrinsics, Skin:  intact, Vascular:  capillary refill <2 seconds distally, Sensation:  decreased in the median nerve distribution, Test:  positive Tinel's at the Carpal Tunnel, positive Direct Carpal Tunnel Compression Test      Cubital Tunnel Exam  Inspection:  no evidence of infection, no edema, no erythema, no ecchymosis, Palpation:  compartments  soft, no pain with palpation, Range of Motion:  full elbow range of motion, Stability:  no elbow instability noted, Strength:  5/5 intrinsic, 5/5 elbow flexion/extension, Skin:  intact, Vascular:  capillary refill <2 seconds distally, Sensation:  decreased in the ulnar nerve distribution, Test:  negative Tinel's at the Cubital Tunnel, positive elbow flexion test.     Constitutional   General appearance: Alert and in no acute distress. Well developed, well nourished.    Eyes   External Eye, Conjunctiva and lids: Normal external exam - pupils were equal in size, round, reactive to light (PERRL) with normal accommodation and extraocular movements intact (EOMI).   Ears, Nose, Mouth, and Throat   Hearing: Normal.   Neck   Neck: No neck mass was observed. Supple.   Pulmonary   Respiratory effort: No respiratory distress.   Cardiovascular   Examination of extremities: No peripheral edema.   Psychiatric   Judgment and insight: Intact.   Orientation to person, place, and time: Alert and oriented x 3.       Mood and affect: Normal.      Bilateral carpal and cubital tunnel syndromes  Based on the history, physical exam and imaging studies above, the patient's presentation is consistent with the above diagnosis.  I had a long discussion with the patient regarding their presentation and the treatment options.  We discussed initial nonoperative versus operative management options as well as potential further diagnostic imaging.  We again discussed her treatment options going forward along with their associated risks and benefits. After thorough discussion, the patient has elected to proceed with conservative management. All questions were answered to the patients satisfaction who seems satisfied with the plan.  They will call the office with any questions/concerns.    Neuromuscular ultrasound  Night splints  Follow-up after neuromuscular ultrasound no x-ray

## 2024-08-14 NOTE — PROGRESS NOTES
Melanie Lance is coming in with bilateral wrist pain and bilateral hand numbness and tingling. Right hand and wrist worse than the left. This initially started during her pregnancy over a year ago and went away. The pain and numbness can back 2-3 months ago. No Hx of Sx, injections, or trauma.  XR done today.

## 2024-10-03 ENCOUNTER — APPOINTMENT (OUTPATIENT)
Dept: OBSTETRICS AND GYNECOLOGY | Facility: CLINIC | Age: 24
End: 2024-10-03
Payer: COMMERCIAL

## 2024-10-03 VITALS
HEIGHT: 63 IN | BODY MASS INDEX: 26.58 KG/M2 | SYSTOLIC BLOOD PRESSURE: 108 MMHG | WEIGHT: 150 LBS | DIASTOLIC BLOOD PRESSURE: 70 MMHG

## 2024-10-03 DIAGNOSIS — N89.8 VAGINAL DISCHARGE: ICD-10-CM

## 2024-10-03 DIAGNOSIS — N76.0 RECURRENT VAGINITIS: Primary | ICD-10-CM

## 2024-10-03 PROCEDURE — 3008F BODY MASS INDEX DOCD: CPT | Performed by: NURSE PRACTITIONER

## 2024-10-03 PROCEDURE — 99214 OFFICE O/P EST MOD 30 MIN: CPT | Performed by: NURSE PRACTITIONER

## 2024-10-03 ASSESSMENT — ENCOUNTER SYMPTOMS
CONSTITUTIONAL NEGATIVE: 1
PSYCHIATRIC NEGATIVE: 1
GASTROINTESTINAL NEGATIVE: 1

## 2024-10-03 NOTE — PROGRESS NOTES
Subjective   Patient ID: Melanie Lance is a 24 y.o. female who presents for Infection (Patient states she has been on and off symptoms of vaginal irritation for approximately 2 years. ).  24 year old here for complaints of having recurring vaginal discharge.  She notes that she has had symptoms off and on for the last 2 years.  She has had BV and yeast back and forth.  She has been treated in the past with vaginal gel which works but her symptoms are only relieved for about 3 days.  She denies any abnormal bleeding.  She has the Iud present.  She notes that she had the allen then had it removed and had a baby then after pregnancy had the Mirena inserted.  This has been happening since her mirena was inserted, no issues with the allen.  She denies any need for std testing.          Review of Systems   Constitutional: Negative.    Gastrointestinal: Negative.    Genitourinary:  Positive for vaginal discharge.   Skin: Negative.    Psychiatric/Behavioral: Negative.         Objective   Physical Exam  Vitals reviewed.   Constitutional:       Appearance: Normal appearance. She is well-developed.   Pulmonary:      Effort: Pulmonary effort is normal. No respiratory distress.   Chest:   Breasts:     Breasts are symmetrical.      Right: Normal. No swelling, bleeding, inverted nipple, mass, nipple discharge, skin change or tenderness.      Left: Normal. No swelling, bleeding, inverted nipple, mass, nipple discharge, skin change or tenderness.   Abdominal:      Palpations: Abdomen is soft.   Genitourinary:     General: Normal vulva.      Exam position: Lithotomy position.      Pubic Area: No rash.       Labia:         Right: No rash, tenderness, lesion or injury.         Left: No rash, tenderness, lesion or injury.       Urethra: No prolapse, urethral pain, urethral swelling or urethral lesion.      Vagina: Vaginal discharge present.      Cervix: Normal.      Uterus: Normal.       Adnexa: Right adnexa normal and left adnexa  normal.      Rectum: Normal.   Musculoskeletal:         General: Normal range of motion.   Lymphadenopathy:      Upper Body:      Right upper body: No supraclavicular, axillary or pectoral adenopathy.      Left upper body: No supraclavicular, axillary or pectoral adenopathy.   Skin:     General: Skin is warm and dry.   Neurological:      General: No focal deficit present.      Mental Status: She is alert and oriented to person, place, and time. Mental status is at baseline.   Psychiatric:         Attention and Perception: Attention and perception normal.         Mood and Affect: Mood and affect normal.         Speech: Speech normal.         Behavior: Behavior normal. Behavior is cooperative.         Thought Content: Thought content normal.         Judgment: Judgment normal.         Assessment/Plan   Problem List Items Addressed This Visit             ICD-10-CM    Vaginal discharge N89.8    Recurrent vaginitis - Primary N76.0    Relevant Orders    Referral to Gynecology   Health track culture sent, will treat pending results if needed  Encouraged boric acid suppositories  Reviewed option to remove IUD if desired, declined at this time  Referral to see Elvis Cooney for evaluation if no improvement  Follow up as needed         ARTURO Ring-CNP 10/03/24 3:39 PM

## 2024-10-03 NOTE — LETTER
Boricap  600mg Boric acid  Vaginal suppository   Insert one suppository into the vagina at bedtime once a week for pH balance    Can change to once a day for 7 nights to help treat an infection if needed, do not use for more than 7 days in a row

## 2024-10-08 DIAGNOSIS — N89.8 VAGINAL DISCHARGE: Primary | ICD-10-CM

## 2024-10-08 RX ORDER — METRONIDAZOLE 7.5 MG/G
GEL VAGINAL NIGHTLY
Qty: 70 G | Refills: 0 | Status: SHIPPED | OUTPATIENT
Start: 2024-10-08 | End: 2024-10-13

## 2024-10-08 RX ORDER — FLUCONAZOLE 150 MG/1
150 TABLET ORAL
Qty: 2 TABLET | Refills: 0 | Status: SHIPPED | OUTPATIENT
Start: 2024-10-08

## 2024-11-11 ENCOUNTER — PATIENT MESSAGE (OUTPATIENT)
Dept: OBSTETRICS AND GYNECOLOGY | Facility: CLINIC | Age: 24
End: 2024-11-11
Payer: COMMERCIAL

## 2024-11-11 DIAGNOSIS — N89.8 VAGINAL DISCHARGE: Primary | ICD-10-CM

## 2024-11-11 RX ORDER — FLUCONAZOLE 150 MG/1
150 TABLET ORAL
Qty: 2 TABLET | Refills: 0 | Status: SHIPPED | OUTPATIENT
Start: 2024-11-11

## 2024-11-11 RX ORDER — DOXYCYCLINE 100 MG/1
100 CAPSULE ORAL 2 TIMES DAILY
Qty: 14 CAPSULE | Refills: 0 | Status: SHIPPED | OUTPATIENT
Start: 2024-11-11 | End: 2024-11-18

## 2025-01-28 ENCOUNTER — APPOINTMENT (OUTPATIENT)
Dept: RADIOLOGY | Facility: HOSPITAL | Age: 25
End: 2025-01-28
Payer: COMMERCIAL

## 2025-01-28 ENCOUNTER — HOSPITAL ENCOUNTER (EMERGENCY)
Facility: HOSPITAL | Age: 25
Discharge: LEFT BEFORE DISCHARGE | End: 2025-01-29
Attending: STUDENT IN AN ORGANIZED HEALTH CARE EDUCATION/TRAINING PROGRAM
Payer: COMMERCIAL

## 2025-01-28 VITALS
WEIGHT: 145 LBS | OXYGEN SATURATION: 99 % | BODY MASS INDEX: 25.69 KG/M2 | HEIGHT: 63 IN | RESPIRATION RATE: 16 BRPM | SYSTOLIC BLOOD PRESSURE: 135 MMHG | HEART RATE: 115 BPM | DIASTOLIC BLOOD PRESSURE: 86 MMHG | TEMPERATURE: 99.8 F

## 2025-01-28 DIAGNOSIS — R10.9 ABDOMINAL PAIN, UNSPECIFIED ABDOMINAL LOCATION: Primary | ICD-10-CM

## 2025-01-28 LAB
ALBUMIN SERPL BCP-MCNC: 5.1 G/DL (ref 3.4–5)
ALP SERPL-CCNC: 69 U/L (ref 33–110)
ALT SERPL W P-5'-P-CCNC: 16 U/L (ref 7–45)
ANION GAP SERPL CALC-SCNC: 14 MMOL/L (ref 10–20)
APPEARANCE UR: CLEAR
AST SERPL W P-5'-P-CCNC: 22 U/L (ref 9–39)
BASOPHILS # BLD AUTO: 0.02 X10*3/UL (ref 0–0.1)
BASOPHILS NFR BLD AUTO: 0.1 %
BILIRUB SERPL-MCNC: 0.6 MG/DL (ref 0–1.2)
BILIRUB UR STRIP.AUTO-MCNC: NEGATIVE MG/DL
BUN SERPL-MCNC: 13 MG/DL (ref 6–23)
CALCIUM SERPL-MCNC: 9.8 MG/DL (ref 8.6–10.3)
CHLORIDE SERPL-SCNC: 103 MMOL/L (ref 98–107)
CO2 SERPL-SCNC: 23 MMOL/L (ref 21–32)
COLOR UR: YELLOW
CREAT SERPL-MCNC: 0.89 MG/DL (ref 0.5–1.05)
EGFRCR SERPLBLD CKD-EPI 2021: >90 ML/MIN/1.73M*2
EOSINOPHIL # BLD AUTO: 0.04 X10*3/UL (ref 0–0.7)
EOSINOPHIL NFR BLD AUTO: 0.2 %
ERYTHROCYTE [DISTWIDTH] IN BLOOD BY AUTOMATED COUNT: 13.2 % (ref 11.5–14.5)
GLUCOSE SERPL-MCNC: 111 MG/DL (ref 74–99)
GLUCOSE UR STRIP.AUTO-MCNC: NORMAL MG/DL
HCG UR QL IA.RAPID: NEGATIVE
HCT VFR BLD AUTO: 50 % (ref 36–46)
HGB BLD-MCNC: 16.5 G/DL (ref 12–16)
IMM GRANULOCYTES # BLD AUTO: 0.09 X10*3/UL (ref 0–0.7)
IMM GRANULOCYTES NFR BLD AUTO: 0.4 % (ref 0–0.9)
KETONES UR STRIP.AUTO-MCNC: ABNORMAL MG/DL
LACTATE SERPL-SCNC: 0.8 MMOL/L (ref 0.4–2)
LEUKOCYTE ESTERASE UR QL STRIP.AUTO: NEGATIVE
LIPASE SERPL-CCNC: 21 U/L (ref 9–82)
LYMPHOCYTES # BLD AUTO: 0.46 X10*3/UL (ref 1.2–4.8)
LYMPHOCYTES NFR BLD AUTO: 2.3 %
MAGNESIUM SERPL-MCNC: 1.95 MG/DL (ref 1.6–2.4)
MCH RBC QN AUTO: 28.8 PG (ref 26–34)
MCHC RBC AUTO-ENTMCNC: 33 G/DL (ref 32–36)
MCV RBC AUTO: 87 FL (ref 80–100)
MONOCYTES # BLD AUTO: 0.77 X10*3/UL (ref 0.1–1)
MONOCYTES NFR BLD AUTO: 3.8 %
MUCOUS THREADS #/AREA URNS AUTO: ABNORMAL /LPF
NEUTROPHILS # BLD AUTO: 19.01 X10*3/UL (ref 1.2–7.7)
NEUTROPHILS NFR BLD AUTO: 93.2 %
NITRITE UR QL STRIP.AUTO: NEGATIVE
NRBC BLD-RTO: 0 /100 WBCS (ref 0–0)
PH UR STRIP.AUTO: 5.5 [PH]
PLATELET # BLD AUTO: 325 X10*3/UL (ref 150–450)
POTASSIUM SERPL-SCNC: 4 MMOL/L (ref 3.5–5.3)
PROT SERPL-MCNC: 8.6 G/DL (ref 6.4–8.2)
PROT UR STRIP.AUTO-MCNC: ABNORMAL MG/DL
RBC # BLD AUTO: 5.73 X10*6/UL (ref 4–5.2)
RBC # UR STRIP.AUTO: ABNORMAL /UL
RBC #/AREA URNS AUTO: >20 /HPF
SODIUM SERPL-SCNC: 136 MMOL/L (ref 136–145)
SP GR UR STRIP.AUTO: 1.04
SQUAMOUS #/AREA URNS AUTO: ABNORMAL /HPF
UROBILINOGEN UR STRIP.AUTO-MCNC: NORMAL MG/DL
WBC # BLD AUTO: 20.4 X10*3/UL (ref 4.4–11.3)
WBC #/AREA URNS AUTO: ABNORMAL /HPF

## 2025-01-28 PROCEDURE — 2500000004 HC RX 250 GENERAL PHARMACY W/ HCPCS (ALT 636 FOR OP/ED): Performed by: STUDENT IN AN ORGANIZED HEALTH CARE EDUCATION/TRAINING PROGRAM

## 2025-01-28 PROCEDURE — 83735 ASSAY OF MAGNESIUM: CPT | Performed by: STUDENT IN AN ORGANIZED HEALTH CARE EDUCATION/TRAINING PROGRAM

## 2025-01-28 PROCEDURE — 85025 COMPLETE CBC W/AUTO DIFF WBC: CPT | Performed by: STUDENT IN AN ORGANIZED HEALTH CARE EDUCATION/TRAINING PROGRAM

## 2025-01-28 PROCEDURE — 83605 ASSAY OF LACTIC ACID: CPT | Performed by: STUDENT IN AN ORGANIZED HEALTH CARE EDUCATION/TRAINING PROGRAM

## 2025-01-28 PROCEDURE — 81001 URINALYSIS AUTO W/SCOPE: CPT | Performed by: STUDENT IN AN ORGANIZED HEALTH CARE EDUCATION/TRAINING PROGRAM

## 2025-01-28 PROCEDURE — 81025 URINE PREGNANCY TEST: CPT | Performed by: STUDENT IN AN ORGANIZED HEALTH CARE EDUCATION/TRAINING PROGRAM

## 2025-01-28 PROCEDURE — 2550000001 HC RX 255 CONTRASTS: Performed by: STUDENT IN AN ORGANIZED HEALTH CARE EDUCATION/TRAINING PROGRAM

## 2025-01-28 PROCEDURE — 83690 ASSAY OF LIPASE: CPT | Performed by: STUDENT IN AN ORGANIZED HEALTH CARE EDUCATION/TRAINING PROGRAM

## 2025-01-28 PROCEDURE — 74174 CTA ABD&PLVS W/CONTRAST: CPT

## 2025-01-28 PROCEDURE — 36415 COLL VENOUS BLD VENIPUNCTURE: CPT | Performed by: STUDENT IN AN ORGANIZED HEALTH CARE EDUCATION/TRAINING PROGRAM

## 2025-01-28 PROCEDURE — 99285 EMERGENCY DEPT VISIT HI MDM: CPT | Mod: 25 | Performed by: STUDENT IN AN ORGANIZED HEALTH CARE EDUCATION/TRAINING PROGRAM

## 2025-01-28 PROCEDURE — 80053 COMPREHEN METABOLIC PANEL: CPT | Performed by: STUDENT IN AN ORGANIZED HEALTH CARE EDUCATION/TRAINING PROGRAM

## 2025-01-28 PROCEDURE — 96361 HYDRATE IV INFUSION ADD-ON: CPT

## 2025-01-28 PROCEDURE — 74174 CTA ABD&PLVS W/CONTRAST: CPT | Performed by: RADIOLOGY

## 2025-01-28 PROCEDURE — 96374 THER/PROPH/DIAG INJ IV PUSH: CPT

## 2025-01-28 RX ORDER — SUCRALFATE 1 G/10ML
1 SUSPENSION ORAL EVERY 6 HOURS SCHEDULED
Status: DISCONTINUED | OUTPATIENT
Start: 2025-01-29 | End: 2025-01-29 | Stop reason: HOSPADM

## 2025-01-28 RX ORDER — DROPERIDOL 2.5 MG/ML
1.25 INJECTION, SOLUTION INTRAMUSCULAR; INTRAVENOUS ONCE
Status: COMPLETED | OUTPATIENT
Start: 2025-01-28 | End: 2025-01-28

## 2025-01-28 RX ADMIN — SODIUM CHLORIDE, POTASSIUM CHLORIDE, SODIUM LACTATE AND CALCIUM CHLORIDE 1000 ML: 600; 310; 30; 20 INJECTION, SOLUTION INTRAVENOUS at 22:23

## 2025-01-28 RX ADMIN — DROPERIDOL 1.25 MG: 2.5 INJECTION, SOLUTION INTRAMUSCULAR; INTRAVENOUS at 22:34

## 2025-01-28 RX ADMIN — IOHEXOL 100 ML: 350 INJECTION, SOLUTION INTRAVENOUS at 23:11

## 2025-01-28 ASSESSMENT — COLUMBIA-SUICIDE SEVERITY RATING SCALE - C-SSRS
2. HAVE YOU ACTUALLY HAD ANY THOUGHTS OF KILLING YOURSELF?: NO
6. HAVE YOU EVER DONE ANYTHING, STARTED TO DO ANYTHING, OR PREPARED TO DO ANYTHING TO END YOUR LIFE?: NO
1. IN THE PAST MONTH, HAVE YOU WISHED YOU WERE DEAD OR WISHED YOU COULD GO TO SLEEP AND NOT WAKE UP?: NO

## 2025-01-28 ASSESSMENT — PAIN SCALES - GENERAL: PAINLEVEL_OUTOF10: 8

## 2025-01-28 ASSESSMENT — PAIN - FUNCTIONAL ASSESSMENT: PAIN_FUNCTIONAL_ASSESSMENT: 0-10

## 2025-01-29 ENCOUNTER — APPOINTMENT (OUTPATIENT)
Dept: OBSTETRICS AND GYNECOLOGY | Facility: CLINIC | Age: 25
End: 2025-01-29
Payer: COMMERCIAL

## 2025-01-29 LAB — HOLD SPECIMEN: NORMAL

## 2025-01-29 RX ORDER — CIPROFLOXACIN 500 MG/1
500 TABLET ORAL 2 TIMES DAILY
Qty: 10 TABLET | Refills: 0 | Status: SHIPPED | OUTPATIENT
Start: 2025-01-29 | End: 2025-02-03

## 2025-01-29 NOTE — ED TRIAGE NOTES
Pt to ed via private vehicle from home c/o abdominal pain, N/V/D on and off since Friday. Pt states was seen in ED and discharged. Pt states had this same problem roughly a year ago and diagnosed with ischemic bowel, pt states PCP states could be start of chrons disease. Pt states some blood in stool, bright red at first and then dark red. Pt not actively vomiting in triage, states did not take any medications for symptoms prior to arrival. Pt states last able to keep soup down yesterday, unable to keep anything down today.

## 2025-01-29 NOTE — ED PROVIDER NOTES
HPI   Chief Complaint   Patient presents with    Abdominal Pain    Nausea    Vomiting    Diarrhea       This is a 24-year-old female who presents to the emergency department for abdominal pain.  She does have PTSD and IBS, depression, generalized anxiety.  She was seen in [place] on Friday for her abdominal pain since her pain started at that time.  She did have bright red blood per rectum on Friday which was a small amount that has improved..  Patient states that she would like to get admitted because she wants scope to see if she has ischemic colitis that she has had them in the past.  She was sent home with Zofran but continues to vomit.                          John Coma Scale Score: 15                  Patient History   Past Medical History:   Diagnosis Date    Depression     Gestational hypertension ([facility]) 10/13/2023    Personal history of other diseases of the respiratory system 01/02/2015    History of acute bacterial sinusitis    PTSD (post-traumatic stress disorder)      Past Surgical History:   Procedure Laterality Date    COLONOSCOPY W/ BIOPSIES  02/01/2024    EYE SURGERY      TYMPANOSTOMY TUBE PLACEMENT Bilateral     UPPER GASTROINTESTINAL ENDOSCOPY       Family History   Problem Relation Name Age of Onset    Testicular cancer Father      Other (mental disorder) Father      Hypertension Father      Other (small cell lung cancer) Maternal Grandmother      Breast cancer Maternal Grandmother      Other (Cardiac disorder) Maternal Grandfather      Other (Cardiac disorder) Paternal Grandmother      Other (Cardiac disorder) Paternal Grandfather      Diabetes Paternal Grandfather       Social History     Tobacco Use    Smoking status: Never     Passive exposure: Never    Smokeless tobacco: Never   Vaping Use    Vaping status: Every Day    Substances: Nicotine    Devices: Disposable   Substance Use Topics    Alcohol use: Not Currently     Comment: occasional    Drug use: Never       Physical Exam   ED  Triage Vitals [01/28/25 1947]   Temperature Heart Rate Respirations BP   37.7 °C (99.8 °F) (!) 115 16 135/86      Pulse Ox Temp Source Heart Rate Source Patient Position   99 % Temporal Monitor --      BP Location FiO2 (%)     -- --       Physical Exam  Constitutional:       General: She is not in acute distress.  HENT:      Head: Normocephalic and atraumatic.      Right Ear: Tympanic membrane normal.      Left Ear: Tympanic membrane normal.      Mouth/Throat:      Mouth: Mucous membranes are moist.   Eyes:      Extraocular Movements: Extraocular movements intact.      Conjunctiva/sclera: Conjunctivae normal.      Pupils: Pupils are equal, round, and reactive to light.   Cardiovascular:      Rate and Rhythm: Normal rate and regular rhythm.      Heart sounds: No murmur heard.  Pulmonary:      Effort: Pulmonary effort is normal. No respiratory distress.      Breath sounds: Normal breath sounds. No stridor. No wheezing or rales.   Abdominal:      General: Bowel sounds are normal. There is no distension.      Tenderness: There is generalized abdominal tenderness. There is no guarding or rebound.   Musculoskeletal:         General: No swelling, tenderness or deformity. Normal range of motion.   Skin:     General: Skin is warm and dry.      Coloration: Skin is not jaundiced.      Findings: No bruising or lesion.   Neurological:      General: No focal deficit present.      Mental Status: She is alert and oriented to person, place, and time. Mental status is at baseline.      Cranial Nerves: No cranial nerve deficit.      Motor: No weakness.   Psychiatric:         Mood and Affect: Mood normal.       Labs Reviewed - No data to display  No orders to display       ED Course & MDM   Diagnoses as of 01/29/25 0132   Abdominal pain, unspecified abdominal location       Medical Decision Making  This is a 24-year-old female who presents emergency department for abdominal pain since Friday along with nausea and vomiting and diarrhea.   She is no longer having bleeding.  On exam she has diffuse tenderness to palpation of her abdomen she does have an elevated leukocytosis.  Fortunately patient left prior to completion.  Her CT does show concerns of infectious/inflammatory colitis I did attempt to call patient but she did not answer so I sent her a message through her chart to let her know that I sent an antibiotic to her pharmacy.        Procedure  Procedures     Zaira Walker MD  01/29/25 0139

## 2025-03-14 ENCOUNTER — OFFICE VISIT (OUTPATIENT)
Dept: URGENT CARE | Age: 25
End: 2025-03-14
Payer: COMMERCIAL

## 2025-03-14 VITALS
OXYGEN SATURATION: 98 % | HEART RATE: 87 BPM | TEMPERATURE: 98.2 F | DIASTOLIC BLOOD PRESSURE: 87 MMHG | RESPIRATION RATE: 16 BRPM | SYSTOLIC BLOOD PRESSURE: 128 MMHG

## 2025-03-14 DIAGNOSIS — B00.1 HERPES LABIALIS: Primary | ICD-10-CM

## 2025-03-14 DIAGNOSIS — J34.89 LESION OF NASAL CAVITY: ICD-10-CM

## 2025-03-14 RX ORDER — MUPIROCIN 20 MG/G
OINTMENT TOPICAL 3 TIMES DAILY
Qty: 22 G | Refills: 0 | Status: SHIPPED | OUTPATIENT
Start: 2025-03-14 | End: 2025-03-24

## 2025-03-14 RX ORDER — VALACYCLOVIR HYDROCHLORIDE 500 MG/1
500 TABLET, FILM COATED ORAL 2 TIMES DAILY
Qty: 14 TABLET | Refills: 0 | Status: SHIPPED | OUTPATIENT
Start: 2025-03-14 | End: 2025-03-21

## 2025-03-14 ASSESSMENT — ENCOUNTER SYMPTOMS
FEVER: 0
SORE THROAT: 0
CHILLS: 0
RHINORRHEA: 0

## 2025-03-14 NOTE — PROGRESS NOTES
Subjective   Patient ID: Melanie Lance is a 25 y.o. female. They present today with a chief complaint of sore on lip.    History of Present Illness  Patient presents today for ongoing sores to right lower lip.  She was seen at [facility] on March 4 for swelling to right lower lip.  She was treated with prednisone and doxycycline.  She states she had completed the prednisone and is almost out of the doxycycline.  She notes that the swelling did improve though she developed painful sores to the area a few days later.  She also has noticed some painful sores in her right nare.  She does have a piercing on the side though this is not new.  She denies any fevers, chills, sore throat, other oral lesions besides the one on her lip.  She does have a history of genital herpes though notes she does not have any outbreak there.          Past Medical History  Allergies as of 03/14/2025 - Reviewed 03/14/2025   Allergen Reaction Noted    Amoxicillin Hives, Itching, and Rash 10/13/2023    Azithromycin Unknown 10/13/2023    Cefdinir Unknown 10/13/2023    Miconazole Swelling 10/13/2023       (Not in a hospital admission)       Past Medical History:   Diagnosis Date    Depression     Gestational hypertension ([facility]) 10/13/2023    Personal history of other diseases of the respiratory system 01/02/2015    History of acute bacterial sinusitis    PTSD (post-traumatic stress disorder)        Past Surgical History:   Procedure Laterality Date    COLONOSCOPY W/ BIOPSIES  02/01/2024    EYE SURGERY      TYMPANOSTOMY TUBE PLACEMENT Bilateral     UPPER GASTROINTESTINAL ENDOSCOPY          reports that she has never smoked. She has never been exposed to tobacco smoke. She has never used smokeless tobacco. She reports that she does not currently use alcohol. She reports that she does not use drugs.    Review of Systems  Review of Systems   Constitutional:  Negative for chills and fever.   HENT:  Positive for mouth sores (right lower lateral  lip). Negative for congestion, ear pain, rhinorrhea and sore throat.                                   Objective    Vitals:    03/14/25 1707   BP: 128/87   Pulse: 87   Resp: 16   Temp: 36.8 °C (98.2 °F)   SpO2: 98%     No LMP recorded. (Menstrual status: IUD).    Physical Exam  Vitals reviewed.   Constitutional:       General: She is not in acute distress.  HENT:      Head: Normocephalic.      Comments: There are few ulcerative lesions on right lower lip on erythematous base.     Nose: No congestion or rhinorrhea.      Comments: There is ulcerated sore to right inner nare       Mouth/Throat:      Mouth: Mucous membranes are moist.      Pharynx: No oropharyngeal exudate or posterior oropharyngeal erythema.   Lymphadenopathy:      Cervical: Cervical adenopathy present.         Procedures    Point of Care Test & Imaging Results from this visit  No results found for this visit on 03/14/25.   No results found.    Diagnostic study results (if any) were reviewed by Faith Simeon PA-C.    Assessment/Plan   Allergies, medications, history, and pertinent labs/EKGs/Imaging reviewed by Faith Simeon PA-C.     Medical Decision Making  Patient presents for ongoing lesions to right lower lip that have not responded to oral steroids or antibiotics.  These do appear consistent with herpes virus.  She is started on valacyclovir.  She does also have a similar appearing lesion in her right nare.  She was prescribed mupirocin ointment to apply topically to that area.  We did discuss to follow-up with her primary care provider if her symptoms or not improving over the next 5 to 7 days, earlier with any acute worsening or new symptoms.  Patient verbalized understanding and was agreeable with this plan and had no questions.    Orders and Diagnoses  Diagnoses and all orders for this visit:  Herpes labialis  -     valACYclovir (Valtrex) 500 mg tablet; Take 1 tablet (500 mg) by mouth 2 times a day for 7 days.  Lesion of nasal cavity  -      mupirocin (Bactroban) 2 % ointment; Apply topically 3 times a day for 10 days.      Medical Admin Record      Patient disposition: Home    Electronically signed by Faith Simeon PA-C  5:23 PM

## 2025-08-05 ENCOUNTER — TELEPHONE (OUTPATIENT)
Dept: OBSTETRICS AND GYNECOLOGY | Facility: CLINIC | Age: 25
End: 2025-08-05

## 2025-08-05 ENCOUNTER — APPOINTMENT (OUTPATIENT)
Dept: OBSTETRICS AND GYNECOLOGY | Facility: CLINIC | Age: 25
End: 2025-08-05
Payer: COMMERCIAL

## 2025-08-05 NOTE — TELEPHONE ENCOUNTER
I do not believe this is something we can offer, maybe you can see if Medical records can help patient?

## 2025-08-05 NOTE — TELEPHONE ENCOUNTER
Patient called stating she lost her Social security, insurance, and 's license. Asking for a stamp sealed document that she can  for identification purposes.

## (undated) DEVICE — TOWEL PACK, STERILE, 4/PACK, BLUE

## (undated) DEVICE — GOWN, ASTOUND, XL

## (undated) DEVICE — GLOVE, PROTEXIS PI CLASSIC, SZ-6.5, PF, LF

## (undated) DEVICE — DRAPE, SHEET, THREE QUARTER, FAN FOLD, 57 X 77 IN

## (undated) DEVICE — SWABSTICK, PREP, IODOPHOR, PVP, 8 IN